# Patient Record
Sex: MALE | Race: WHITE | NOT HISPANIC OR LATINO | Employment: OTHER | URBAN - METROPOLITAN AREA
[De-identification: names, ages, dates, MRNs, and addresses within clinical notes are randomized per-mention and may not be internally consistent; named-entity substitution may affect disease eponyms.]

---

## 2023-09-12 ENCOUNTER — HOSPITAL ENCOUNTER (INPATIENT)
Facility: HOSPITAL | Age: 68
LOS: 3 days | Discharge: HOME | End: 2023-09-15
Attending: EMERGENCY MEDICINE | Admitting: HOSPITALIST

## 2023-09-12 ENCOUNTER — APPOINTMENT (OUTPATIENT)
Dept: GENERAL RADIOLOGY | Facility: HOSPITAL | Age: 68
End: 2023-09-12

## 2023-09-12 ENCOUNTER — APPOINTMENT (OUTPATIENT)
Dept: CT IMAGING | Facility: HOSPITAL | Age: 68
End: 2023-09-12

## 2023-09-12 DIAGNOSIS — D72.825 BANDEMIA: ICD-10-CM

## 2023-09-12 DIAGNOSIS — R11.2 NAUSEA VOMITING AND DIARRHEA: Primary | ICD-10-CM

## 2023-09-12 DIAGNOSIS — R73.9 HYPERGLYCEMIA: ICD-10-CM

## 2023-09-12 DIAGNOSIS — R50.9 FEVER, UNSPECIFIED FEVER CAUSE: ICD-10-CM

## 2023-09-12 DIAGNOSIS — E87.1 HYPONATREMIA: ICD-10-CM

## 2023-09-12 DIAGNOSIS — R19.7 DIARRHEA OF PRESUMED INFECTIOUS ORIGIN: ICD-10-CM

## 2023-09-12 DIAGNOSIS — K76.0 FATTY LIVER: ICD-10-CM

## 2023-09-12 DIAGNOSIS — R19.7 NAUSEA VOMITING AND DIARRHEA: Primary | ICD-10-CM

## 2023-09-12 DIAGNOSIS — R79.89 ELEVATED LFTS: ICD-10-CM

## 2023-09-12 PROBLEM — N39.0 ACUTE UTI (URINARY TRACT INFECTION): Status: ACTIVE | Noted: 2023-09-12

## 2023-09-12 PROBLEM — E87.20 LACTIC ACIDOSIS: Status: ACTIVE | Noted: 2023-09-12

## 2023-09-12 LAB
ALBUMIN SERPL-MCNC: 3.5 G/DL (ref 3.5–5.2)
ALBUMIN/GLOB SERPL: 0.7 G/DL
ALP SERPL-CCNC: 88 U/L (ref 39–117)
ALT SERPL W P-5'-P-CCNC: 56 U/L (ref 1–41)
ANION GAP SERPL CALCULATED.3IONS-SCNC: 12 MMOL/L (ref 5–15)
AST SERPL-CCNC: 58 U/L (ref 1–40)
BACTERIA UR QL AUTO: ABNORMAL /HPF
BASOPHILS # BLD MANUAL: 0 10*3/MM3 (ref 0–0.2)
BASOPHILS NFR BLD MANUAL: 0 % (ref 0–1.5)
BILIRUB SERPL-MCNC: 1.3 MG/DL (ref 0–1.2)
BILIRUB UR QL STRIP: ABNORMAL
BUN SERPL-MCNC: 18 MG/DL (ref 8–23)
BUN/CREAT SERPL: 14.8 (ref 7–25)
CALCIUM SPEC-SCNC: 9.1 MG/DL (ref 8.6–10.5)
CHLORIDE SERPL-SCNC: 91 MMOL/L (ref 98–107)
CLARITY UR: CLEAR
CO2 SERPL-SCNC: 23 MMOL/L (ref 22–29)
COLOR UR: ABNORMAL
CREAT BLDA-MCNC: 1.2 MG/DL
CREAT SERPL-MCNC: 1.22 MG/DL (ref 0.76–1.27)
D-LACTATE SERPL-SCNC: 1.7 MMOL/L (ref 0.5–2)
D-LACTATE SERPL-SCNC: 3.2 MMOL/L (ref 0.5–2)
DEPRECATED RDW RBC AUTO: 44.9 FL (ref 37–54)
EGFRCR SERPLBLD CKD-EPI 2021: 64.6 ML/MIN/1.73
EOSINOPHIL # BLD MANUAL: 0 10*3/MM3 (ref 0–0.4)
EOSINOPHIL NFR BLD MANUAL: 0 % (ref 0.3–6.2)
ERYTHROCYTE [DISTWIDTH] IN BLOOD BY AUTOMATED COUNT: 13.9 % (ref 12.3–15.4)
FLUAV SUBTYP SPEC NAA+PROBE: NOT DETECTED
FLUBV RNA ISLT QL NAA+PROBE: NOT DETECTED
GLOBULIN UR ELPH-MCNC: 5.2 GM/DL
GLUCOSE SERPL-MCNC: 303 MG/DL (ref 65–99)
GLUCOSE UR STRIP-MCNC: ABNORMAL MG/DL
HAV IGM SERPL QL IA: NORMAL
HBV CORE IGM SERPL QL IA: NORMAL
HBV SURFACE AG SERPL QL IA: NORMAL
HCT VFR BLD AUTO: 44.7 % (ref 37.5–51)
HCV AB SER DONR QL: NORMAL
HGB BLD-MCNC: 14.9 G/DL (ref 13–17.7)
HGB UR QL STRIP.AUTO: NEGATIVE
HOLD SPECIMEN: NORMAL
HYALINE CASTS UR QL AUTO: ABNORMAL /LPF
KETONES UR QL STRIP: NEGATIVE
LEUKOCYTE ESTERASE UR QL STRIP.AUTO: ABNORMAL
LIPASE SERPL-CCNC: 32 U/L (ref 13–60)
LYMPHOCYTES # BLD MANUAL: 2.26 10*3/MM3 (ref 0.7–3.1)
LYMPHOCYTES NFR BLD MANUAL: 8 % (ref 5–12)
MCH RBC QN AUTO: 29.7 PG (ref 26.6–33)
MCHC RBC AUTO-ENTMCNC: 33.3 G/DL (ref 31.5–35.7)
MCV RBC AUTO: 89.2 FL (ref 79–97)
MONOCYTES # BLD: 0.82 10*3/MM3 (ref 0.1–0.9)
MUCOUS THREADS URNS QL MICRO: ABNORMAL /HPF
NEUTROPHILS # BLD AUTO: 7.18 10*3/MM3 (ref 1.7–7)
NEUTROPHILS NFR BLD MANUAL: 60 % (ref 42.7–76)
NEUTS BAND NFR BLD MANUAL: 10 % (ref 0–5)
NITRITE UR QL STRIP: NEGATIVE
OSMOLALITY SERPL: 289 MOSM/KG (ref 275–295)
OSMOLALITY UR: 715 MOSM/KG (ref 300–1100)
PH UR STRIP.AUTO: 5.5 [PH] (ref 5–8)
PLATELET # BLD AUTO: 155 10*3/MM3 (ref 140–450)
PMV BLD AUTO: 9.3 FL (ref 6–12)
POTASSIUM SERPL-SCNC: 4 MMOL/L (ref 3.5–5.2)
PROCALCITONIN SERPL-MCNC: 0.73 NG/ML (ref 0–0.25)
PROT SERPL-MCNC: 8.7 G/DL (ref 6–8.5)
PROT UR QL STRIP: ABNORMAL
RBC # BLD AUTO: 5.01 10*6/MM3 (ref 4.14–5.8)
RBC # UR STRIP: ABNORMAL /HPF
RBC MORPH BLD: NORMAL
REF LAB TEST METHOD: ABNORMAL
SARS-COV-2 RNA RESP QL NAA+PROBE: NOT DETECTED
SMALL PLATELETS BLD QL SMEAR: ADEQUATE
SODIUM SERPL-SCNC: 126 MMOL/L (ref 136–145)
SODIUM UR-SCNC: <20 MMOL/L
SP GR UR STRIP: 1.02 (ref 1–1.03)
SQUAMOUS #/AREA URNS HPF: ABNORMAL /HPF
UROBILINOGEN UR QL STRIP: ABNORMAL
VARIANT LYMPHS NFR BLD MANUAL: 19 % (ref 19.6–45.3)
VARIANT LYMPHS NFR BLD MANUAL: 3 % (ref 0–5)
WBC # UR STRIP: ABNORMAL /HPF
WBC MORPH BLD: NORMAL
WBC NRBC COR # BLD: 10.25 10*3/MM3 (ref 3.4–10.8)
WHOLE BLOOD HOLD COAG: NORMAL
WHOLE BLOOD HOLD SPECIMEN: NORMAL

## 2023-09-12 PROCEDURE — 25010000002 VANCOMYCIN 10 G RECONSTITUTED SOLUTION: Performed by: INTERNAL MEDICINE

## 2023-09-12 PROCEDURE — 83605 ASSAY OF LACTIC ACID: CPT | Performed by: INTERNAL MEDICINE

## 2023-09-12 PROCEDURE — 99223 1ST HOSP IP/OBS HIGH 75: CPT | Performed by: INTERNAL MEDICINE

## 2023-09-12 PROCEDURE — 84145 PROCALCITONIN (PCT): CPT | Performed by: INTERNAL MEDICINE

## 2023-09-12 PROCEDURE — 81001 URINALYSIS AUTO W/SCOPE: CPT | Performed by: EMERGENCY MEDICINE

## 2023-09-12 PROCEDURE — 84300 ASSAY OF URINE SODIUM: CPT | Performed by: INTERNAL MEDICINE

## 2023-09-12 PROCEDURE — 87636 SARSCOV2 & INF A&B AMP PRB: CPT | Performed by: PHYSICIAN ASSISTANT

## 2023-09-12 PROCEDURE — 83036 HEMOGLOBIN GLYCOSYLATED A1C: CPT | Performed by: INTERNAL MEDICINE

## 2023-09-12 PROCEDURE — 87086 URINE CULTURE/COLONY COUNT: CPT | Performed by: INTERNAL MEDICINE

## 2023-09-12 PROCEDURE — 25010000002 PIPERACILLIN SOD-TAZOBACTAM PER 1 G: Performed by: INTERNAL MEDICINE

## 2023-09-12 PROCEDURE — 83690 ASSAY OF LIPASE: CPT | Performed by: EMERGENCY MEDICINE

## 2023-09-12 PROCEDURE — 80074 ACUTE HEPATITIS PANEL: CPT | Performed by: INTERNAL MEDICINE

## 2023-09-12 PROCEDURE — 82565 ASSAY OF CREATININE: CPT

## 2023-09-12 PROCEDURE — 25510000001 IOPAMIDOL 61 % SOLUTION: Performed by: EMERGENCY MEDICINE

## 2023-09-12 PROCEDURE — 71045 X-RAY EXAM CHEST 1 VIEW: CPT

## 2023-09-12 PROCEDURE — 85007 BL SMEAR W/DIFF WBC COUNT: CPT | Performed by: EMERGENCY MEDICINE

## 2023-09-12 PROCEDURE — 87040 BLOOD CULTURE FOR BACTERIA: CPT | Performed by: PHYSICIAN ASSISTANT

## 2023-09-12 PROCEDURE — 85025 COMPLETE CBC W/AUTO DIFF WBC: CPT | Performed by: EMERGENCY MEDICINE

## 2023-09-12 PROCEDURE — 83930 ASSAY OF BLOOD OSMOLALITY: CPT | Performed by: PHYSICIAN ASSISTANT

## 2023-09-12 PROCEDURE — 74177 CT ABD & PELVIS W/CONTRAST: CPT

## 2023-09-12 PROCEDURE — 80053 COMPREHEN METABOLIC PANEL: CPT | Performed by: EMERGENCY MEDICINE

## 2023-09-12 PROCEDURE — 99285 EMERGENCY DEPT VISIT HI MDM: CPT

## 2023-09-12 PROCEDURE — 83605 ASSAY OF LACTIC ACID: CPT | Performed by: EMERGENCY MEDICINE

## 2023-09-12 PROCEDURE — 83935 ASSAY OF URINE OSMOLALITY: CPT | Performed by: INTERNAL MEDICINE

## 2023-09-12 RX ORDER — SODIUM CHLORIDE 0.9 % (FLUSH) 0.9 %
10 SYRINGE (ML) INJECTION AS NEEDED
Status: DISCONTINUED | OUTPATIENT
Start: 2023-09-12 | End: 2023-09-15 | Stop reason: HOSPADM

## 2023-09-12 RX ORDER — BISACODYL 10 MG
10 SUPPOSITORY, RECTAL RECTAL DAILY PRN
Status: DISCONTINUED | OUTPATIENT
Start: 2023-09-12 | End: 2023-09-13

## 2023-09-12 RX ORDER — DEXTROSE MONOHYDRATE 25 G/50ML
25 INJECTION, SOLUTION INTRAVENOUS
Status: DISCONTINUED | OUTPATIENT
Start: 2023-09-12 | End: 2023-09-15 | Stop reason: HOSPADM

## 2023-09-12 RX ORDER — HEPARIN SODIUM 5000 [USP'U]/ML
5000 INJECTION, SOLUTION INTRAVENOUS; SUBCUTANEOUS EVERY 12 HOURS SCHEDULED
Status: DISCONTINUED | OUTPATIENT
Start: 2023-09-13 | End: 2023-09-13

## 2023-09-12 RX ORDER — IBUPROFEN 600 MG/1
1 TABLET ORAL
Status: DISCONTINUED | OUTPATIENT
Start: 2023-09-12 | End: 2023-09-15 | Stop reason: HOSPADM

## 2023-09-12 RX ORDER — BISACODYL 5 MG/1
5 TABLET, DELAYED RELEASE ORAL DAILY PRN
Status: DISCONTINUED | OUTPATIENT
Start: 2023-09-12 | End: 2023-09-13

## 2023-09-12 RX ORDER — AMOXICILLIN 250 MG
2 CAPSULE ORAL 2 TIMES DAILY
Status: DISCONTINUED | OUTPATIENT
Start: 2023-09-13 | End: 2023-09-13

## 2023-09-12 RX ORDER — SODIUM CHLORIDE 0.9 % (FLUSH) 0.9 %
10 SYRINGE (ML) INJECTION EVERY 12 HOURS SCHEDULED
Status: DISCONTINUED | OUTPATIENT
Start: 2023-09-13 | End: 2023-09-15 | Stop reason: HOSPADM

## 2023-09-12 RX ORDER — SODIUM CHLORIDE 9 MG/ML
40 INJECTION, SOLUTION INTRAVENOUS AS NEEDED
Status: DISCONTINUED | OUTPATIENT
Start: 2023-09-12 | End: 2023-09-15 | Stop reason: HOSPADM

## 2023-09-12 RX ORDER — SODIUM CHLORIDE 9 MG/ML
125 INJECTION, SOLUTION INTRAVENOUS CONTINUOUS
Status: DISCONTINUED | OUTPATIENT
Start: 2023-09-13 | End: 2023-09-13

## 2023-09-12 RX ORDER — NICOTINE POLACRILEX 4 MG
15 LOZENGE BUCCAL
Status: DISCONTINUED | OUTPATIENT
Start: 2023-09-12 | End: 2023-09-15 | Stop reason: HOSPADM

## 2023-09-12 RX ORDER — VANCOMYCIN/0.9 % SOD CHLORIDE 1.5G/250ML
15 PLASTIC BAG, INJECTION (ML) INTRAVENOUS EVERY 24 HOURS
Status: DISCONTINUED | OUTPATIENT
Start: 2023-09-13 | End: 2023-09-14

## 2023-09-12 RX ORDER — NITROGLYCERIN 0.4 MG/1
0.4 TABLET SUBLINGUAL
Status: DISCONTINUED | OUTPATIENT
Start: 2023-09-12 | End: 2023-09-15 | Stop reason: HOSPADM

## 2023-09-12 RX ORDER — INSULIN LISPRO 100 [IU]/ML
2-7 INJECTION, SOLUTION INTRAVENOUS; SUBCUTANEOUS
Status: DISCONTINUED | OUTPATIENT
Start: 2023-09-13 | End: 2023-09-15 | Stop reason: HOSPADM

## 2023-09-12 RX ORDER — POLYETHYLENE GLYCOL 3350 17 G/17G
17 POWDER, FOR SOLUTION ORAL DAILY PRN
Status: DISCONTINUED | OUTPATIENT
Start: 2023-09-12 | End: 2023-09-13

## 2023-09-12 RX ORDER — SODIUM CHLORIDE 9 MG/ML
10 INJECTION INTRAVENOUS AS NEEDED
Status: DISCONTINUED | OUTPATIENT
Start: 2023-09-12 | End: 2023-09-15 | Stop reason: HOSPADM

## 2023-09-12 RX ADMIN — IOPAMIDOL 85 ML: 612 INJECTION, SOLUTION INTRAVENOUS at 16:47

## 2023-09-12 RX ADMIN — PIPERACILLIN SODIUM AND TAZOBACTAM SODIUM 3.38 G: 3; .375 INJECTION, SOLUTION INTRAVENOUS at 22:38

## 2023-09-12 RX ADMIN — VANCOMYCIN HYDROCHLORIDE 1750 MG: 10 INJECTION, POWDER, LYOPHILIZED, FOR SOLUTION INTRAVENOUS at 22:38

## 2023-09-12 RX ADMIN — SODIUM CHLORIDE 1000 ML: 9 INJECTION, SOLUTION INTRAVENOUS at 18:54

## 2023-09-12 NOTE — ED PROVIDER NOTES
Subjective   History of Present Illness  Mr. Faye is a 68-year-old male with no known health issues other than prior kidney stones, who presents to the emergency department with a 1 month history of nausea, vomiting and diarrhea and low-grade fevers off and on.  He has had some mild right flank pain and some urinary incontinence but has had no dysuria or gross hematuria.  No bloody stools.  No cough, chest pain or shortness of breath.  No known exposures.  The patient has no PCP and has not seen anyone for his symptoms.  He states that he has had about 8 pound weight loss in the past 2 weeks.  He feels dizzy on standing.  He is a non-smoker.  No alcohol use.    Review of Systems   Constitutional:  Positive for appetite change, chills, fatigue, fever and unexpected weight change.   HENT:  Negative for sore throat.    Respiratory:  Negative for cough and shortness of breath.    Cardiovascular:  Negative for chest pain and palpitations.   Gastrointestinal:  Positive for diarrhea, nausea and vomiting. Negative for abdominal pain and blood in stool.   Genitourinary:  Positive for flank pain (Mild right flank pain). Negative for dysuria and testicular pain.   Musculoskeletal:  Negative for back pain.   Skin:  Negative for color change, pallor and rash.   Allergic/Immunologic: Negative for immunocompromised state.   Neurological:  Positive for weakness (Generalized) and light-headedness. Negative for headaches.   Hematological: Negative.    Psychiatric/Behavioral:  Negative for confusion.      No past medical history on file.    No Known Allergies    No past surgical history on file.    No family history on file.    Social History     Socioeconomic History    Marital status:            Objective   Physical Exam  Constitutional:       General: He is not in acute distress.     Appearance: Normal appearance. He is not toxic-appearing or diaphoretic.   HENT:      Head: Normocephalic.      Nose: Nose normal.       Mouth/Throat:      Mouth: Mucous membranes are moist.   Eyes:      General: No scleral icterus.     Conjunctiva/sclera: Conjunctivae normal.      Pupils: Pupils are equal, round, and reactive to light.   Cardiovascular:      Rate and Rhythm: Normal rate and regular rhythm.      Pulses: Normal pulses.      Heart sounds: Normal heart sounds.   Pulmonary:      Effort: Pulmonary effort is normal.      Breath sounds: Normal breath sounds.   Abdominal:      General: Bowel sounds are normal.      Tenderness: There is no abdominal tenderness.   Musculoskeletal:         General: Normal range of motion.      Cervical back: Normal range of motion and neck supple.   Skin:     General: Skin is warm and dry.      Coloration: Skin is not jaundiced or pale.      Findings: No erythema or rash.   Neurological:      General: No focal deficit present.      Mental Status: He is alert and oriented to person, place, and time.   Psychiatric:         Mood and Affect: Mood normal.       Procedures           ED Course      Mr. Faye is a 68 yr old male with a history of prior kidney stones who presents to the ED with c/o N/V/D and low grade fevers off/on for the past month.  He states he has lost appetite and has lost 8 lbs in the past 2 wks.  He has night sweats and chills.  Intermittent right flank pain.  No dysuria but some increased frequency.  He feels generally weak.  No chest pain or cough.  No known exposures.      MDM:  differential includes colitis, gastroenteritis, UTI, malignancy, COVID, diverticulitis, etc.    Labs, UA, ordered.  Stool for c diff toxin and culture ordered.  COVID swab collected.  Pt sent for CT abd/pelvis.  Pt started on IV fluids.    White count is 10.25 with bandemia (10%).  Lactic acid is elevated at 3.2.    Glucose is elevated at 303.  Normal creatinine at 1.22.    Sodium is down at 126.  No other concerning electrolytes.    LFTs are up slightly with ALT of 56 and AST of 58 with a bili of 1.3.  Normal lipase  "at 32.      UA shows 6-12 WBC with 2+ bacteria.  Will culture and start on Rocephin.    CT abd/pelvis;  Hepatic steatosis and mild splenomegaly.     Otherwise, no evidence of acute abnormality in the abdomen or pelvis.    CXR shows no active disease.    COVID swab is negative.      I spoke with pt and his wife about his workup.  He states he feels 'terrible\" and is chilling at the moment.  I checked his temp and it was 100.2.    Given his bandemia and his recent fevers, I think admission for IV abx and further workup is warranted.   He has been unable to provide a stool specimen as of yet.  Will discuss with hospitalist and plan to admit.                                                            Medical Decision Making  Problems Addressed:  Bandemia: complicated acute illness or injury  Fever, unspecified fever cause: complicated acute illness or injury  Hyperglycemia: complicated acute illness or injury  Hyponatremia: complicated acute illness or injury  Nausea vomiting and diarrhea: complicated acute illness or injury    Amount and/or Complexity of Data Reviewed  Labs: ordered.  Radiology: ordered.    Risk  Prescription drug management.  Decision regarding hospitalization.        Final diagnoses:   Nausea vomiting and diarrhea   Bandemia   Hyponatremia   Fever, unspecified fever cause   Hyperglycemia       ED Disposition  ED Disposition       ED Disposition   Decision to Admit    Condition   --    Comment   --               No follow-up provider specified.       Medication List      No changes were made to your prescriptions during this visit.            Bruno Ryan PA  09/12/23 2119       Bruno Ryan PA  09/12/23 2121    "

## 2023-09-13 ENCOUNTER — APPOINTMENT (OUTPATIENT)
Dept: ULTRASOUND IMAGING | Facility: HOSPITAL | Age: 68
End: 2023-09-13

## 2023-09-13 LAB
ADV 40+41 DNA STL QL NAA+NON-PROBE: NOT DETECTED
ALBUMIN SERPL-MCNC: 3 G/DL (ref 3.5–5.2)
ALBUMIN/GLOB SERPL: 0.7 G/DL
ALP SERPL-CCNC: 72 U/L (ref 39–117)
ALPHA1 GLOB MFR UR ELPH: 183 MG/DL (ref 90–200)
ALT SERPL W P-5'-P-CCNC: 46 U/L (ref 1–41)
ANION GAP SERPL CALCULATED.3IONS-SCNC: 10 MMOL/L (ref 5–15)
ANION GAP SERPL CALCULATED.3IONS-SCNC: 11 MMOL/L (ref 5–15)
ANION GAP SERPL CALCULATED.3IONS-SCNC: 11 MMOL/L (ref 5–15)
AST SERPL-CCNC: 51 U/L (ref 1–40)
ASTRO TYP 1-8 RNA STL QL NAA+NON-PROBE: NOT DETECTED
BASOPHILS # BLD AUTO: 0.02 10*3/MM3 (ref 0–0.2)
BASOPHILS NFR BLD AUTO: 0.2 % (ref 0–1.5)
BILIRUB SERPL-MCNC: 1.1 MG/DL (ref 0–1.2)
BUN SERPL-MCNC: 14 MG/DL (ref 8–23)
BUN SERPL-MCNC: 15 MG/DL (ref 8–23)
BUN SERPL-MCNC: 15 MG/DL (ref 8–23)
BUN/CREAT SERPL: 12.8 (ref 7–25)
BUN/CREAT SERPL: 12.8 (ref 7–25)
BUN/CREAT SERPL: 13.3 (ref 7–25)
C CAYETANENSIS DNA STL QL NAA+NON-PROBE: NOT DETECTED
C COLI+JEJ+UPSA DNA STL QL NAA+NON-PROBE: NOT DETECTED
C DIFF TOX GENS STL QL NAA+PROBE: NOT DETECTED
CALCIUM SPEC-SCNC: 7.9 MG/DL (ref 8.6–10.5)
CALCIUM SPEC-SCNC: 8 MG/DL (ref 8.6–10.5)
CALCIUM SPEC-SCNC: 8.2 MG/DL (ref 8.6–10.5)
CERULOPLASMIN SERPL-MCNC: 27 MG/DL (ref 16–31)
CHLORIDE SERPL-SCNC: 100 MMOL/L (ref 98–107)
CHLORIDE SERPL-SCNC: 97 MMOL/L (ref 98–107)
CHLORIDE SERPL-SCNC: 99 MMOL/L (ref 98–107)
CO2 SERPL-SCNC: 22 MMOL/L (ref 22–29)
CO2 SERPL-SCNC: 24 MMOL/L (ref 22–29)
CO2 SERPL-SCNC: 24 MMOL/L (ref 22–29)
CORTIS SERPL-MCNC: 20.22 MCG/DL
CREAT SERPL-MCNC: 1.05 MG/DL (ref 0.76–1.27)
CREAT SERPL-MCNC: 1.17 MG/DL (ref 0.76–1.27)
CREAT SERPL-MCNC: 1.17 MG/DL (ref 0.76–1.27)
CRP SERPL-MCNC: 10.78 MG/DL (ref 0–0.5)
CRYPTOSP DNA STL QL NAA+NON-PROBE: NOT DETECTED
D-LACTATE SERPL-SCNC: 1.8 MMOL/L (ref 0.5–2)
D-LACTATE SERPL-SCNC: 2.2 MMOL/L (ref 0.5–2)
DEPRECATED RDW RBC AUTO: 43.2 FL (ref 37–54)
E HISTOLYT DNA STL QL NAA+NON-PROBE: NOT DETECTED
EAEC PAA PLAS AGGR+AATA ST NAA+NON-PRB: NOT DETECTED
EC STX1+STX2 GENES STL QL NAA+NON-PROBE: NOT DETECTED
EGFRCR SERPLBLD CKD-EPI 2021: 67.9 ML/MIN/1.73
EGFRCR SERPLBLD CKD-EPI 2021: 67.9 ML/MIN/1.73
EGFRCR SERPLBLD CKD-EPI 2021: 77.3 ML/MIN/1.73
EOSINOPHIL # BLD AUTO: 0 10*3/MM3 (ref 0–0.4)
EOSINOPHIL NFR BLD AUTO: 0 % (ref 0.3–6.2)
EPEC EAE GENE STL QL NAA+NON-PROBE: NOT DETECTED
ERYTHROCYTE [DISTWIDTH] IN BLOOD BY AUTOMATED COUNT: 13.7 % (ref 12.3–15.4)
ETEC LTA+ST1A+ST1B TOX ST NAA+NON-PROBE: NOT DETECTED
G LAMBLIA DNA STL QL NAA+NON-PROBE: NOT DETECTED
GLOBULIN UR ELPH-MCNC: 4.3 GM/DL
GLUCOSE BLDC GLUCOMTR-MCNC: 122 MG/DL (ref 70–130)
GLUCOSE BLDC GLUCOMTR-MCNC: 162 MG/DL (ref 70–130)
GLUCOSE BLDC GLUCOMTR-MCNC: 172 MG/DL (ref 70–130)
GLUCOSE BLDC GLUCOMTR-MCNC: 187 MG/DL (ref 70–130)
GLUCOSE SERPL-MCNC: 169 MG/DL (ref 65–99)
GLUCOSE SERPL-MCNC: 179 MG/DL (ref 65–99)
GLUCOSE SERPL-MCNC: 187 MG/DL (ref 65–99)
HBA1C MFR BLD: 8 % (ref 4.8–5.6)
HCT VFR BLD AUTO: 36.4 % (ref 37.5–51)
HGB BLD-MCNC: 12.5 G/DL (ref 13–17.7)
IGA1 MFR SER: 415 MG/DL (ref 70–400)
IMM GRANULOCYTES # BLD AUTO: 0.04 10*3/MM3 (ref 0–0.05)
IMM GRANULOCYTES NFR BLD AUTO: 0.5 % (ref 0–0.5)
LYMPHOCYTES # BLD AUTO: 2.25 10*3/MM3 (ref 0.7–3.1)
LYMPHOCYTES NFR BLD AUTO: 26.3 % (ref 19.6–45.3)
MAGNESIUM SERPL-MCNC: 2.1 MG/DL (ref 1.6–2.4)
MCH RBC QN AUTO: 30 PG (ref 26.6–33)
MCHC RBC AUTO-ENTMCNC: 34.3 G/DL (ref 31.5–35.7)
MCV RBC AUTO: 87.3 FL (ref 79–97)
MONOCYTES # BLD AUTO: 0.77 10*3/MM3 (ref 0.1–0.9)
MONOCYTES NFR BLD AUTO: 9 % (ref 5–12)
NEUTROPHILS NFR BLD AUTO: 5.47 10*3/MM3 (ref 1.7–7)
NEUTROPHILS NFR BLD AUTO: 64 % (ref 42.7–76)
NOROVIRUS GI+II RNA STL QL NAA+NON-PROBE: NOT DETECTED
NRBC BLD AUTO-RTO: 0 /100 WBC (ref 0–0.2)
P SHIGELLOIDES DNA STL QL NAA+NON-PROBE: NOT DETECTED
PHOSPHATE SERPL-MCNC: 2.2 MG/DL (ref 2.5–4.5)
PHOSPHATE SERPL-MCNC: 2.2 MG/DL (ref 2.5–4.5)
PLAT MORPH BLD: NORMAL
PLATELET # BLD AUTO: 143 10*3/MM3 (ref 140–450)
PMV BLD AUTO: 10.4 FL (ref 6–12)
POTASSIUM SERPL-SCNC: 3.7 MMOL/L (ref 3.5–5.2)
POTASSIUM SERPL-SCNC: 3.8 MMOL/L (ref 3.5–5.2)
POTASSIUM SERPL-SCNC: 4.2 MMOL/L (ref 3.5–5.2)
PROT SERPL-MCNC: 7.3 G/DL (ref 6–8.5)
RBC # BLD AUTO: 4.17 10*6/MM3 (ref 4.14–5.8)
RBC MORPH BLD: NORMAL
RVA RNA STL QL NAA+NON-PROBE: NOT DETECTED
S ENT+BONG DNA STL QL NAA+NON-PROBE: NOT DETECTED
SAPO I+II+IV+V RNA STL QL NAA+NON-PROBE: NOT DETECTED
SHIGELLA SP+EIEC IPAH ST NAA+NON-PROBE: NOT DETECTED
SODIUM SERPL-SCNC: 132 MMOL/L (ref 136–145)
SODIUM SERPL-SCNC: 133 MMOL/L (ref 136–145)
SODIUM SERPL-SCNC: 133 MMOL/L (ref 136–145)
TSH SERPL DL<=0.05 MIU/L-ACNC: 0.89 UIU/ML (ref 0.27–4.2)
V CHOL+PARA+VUL DNA STL QL NAA+NON-PROBE: NOT DETECTED
V CHOLERAE DNA STL QL NAA+NON-PROBE: NOT DETECTED
WBC MORPH BLD: NORMAL
WBC NRBC COR # BLD: 8.55 10*3/MM3 (ref 3.4–10.8)
Y ENTEROCOL DNA STL QL NAA+NON-PROBE: NOT DETECTED

## 2023-09-13 PROCEDURE — 86644 CMV ANTIBODY: CPT | Performed by: PHYSICIAN ASSISTANT

## 2023-09-13 PROCEDURE — 82784 ASSAY IGA/IGD/IGG/IGM EACH: CPT | Performed by: PHYSICIAN ASSISTANT

## 2023-09-13 PROCEDURE — 97161 PT EVAL LOW COMPLEX 20 MIN: CPT

## 2023-09-13 PROCEDURE — 87493 C DIFF AMPLIFIED PROBE: CPT | Performed by: PHYSICIAN ASSISTANT

## 2023-09-13 PROCEDURE — 25010000002 PIPERACILLIN SOD-TAZOBACTAM PER 1 G: Performed by: INTERNAL MEDICINE

## 2023-09-13 PROCEDURE — 25010000002 VANCOMYCIN 10 G RECONSTITUTED SOLUTION: Performed by: INTERNAL MEDICINE

## 2023-09-13 PROCEDURE — 82390 ASSAY OF CERULOPLASMIN: CPT | Performed by: PHYSICIAN ASSISTANT

## 2023-09-13 PROCEDURE — 25010000002 HEPARIN (PORCINE) PER 1000 UNITS: Performed by: STUDENT IN AN ORGANIZED HEALTH CARE EDUCATION/TRAINING PROGRAM

## 2023-09-13 PROCEDURE — 82533 TOTAL CORTISOL: CPT | Performed by: INTERNAL MEDICINE

## 2023-09-13 PROCEDURE — 85007 BL SMEAR W/DIFF WBC COUNT: CPT | Performed by: INTERNAL MEDICINE

## 2023-09-13 PROCEDURE — 99222 1ST HOSP IP/OBS MODERATE 55: CPT | Performed by: PHYSICIAN ASSISTANT

## 2023-09-13 PROCEDURE — 86665 EPSTEIN-BARR CAPSID VCA: CPT | Performed by: PHYSICIAN ASSISTANT

## 2023-09-13 PROCEDURE — 25010000002 HEPARIN (PORCINE) PER 1000 UNITS: Performed by: INTERNAL MEDICINE

## 2023-09-13 PROCEDURE — 86645 CMV ANTIBODY IGM: CPT | Performed by: PHYSICIAN ASSISTANT

## 2023-09-13 PROCEDURE — 25010000002 ONDANSETRON PER 1 MG: Performed by: STUDENT IN AN ORGANIZED HEALTH CARE EDUCATION/TRAINING PROGRAM

## 2023-09-13 PROCEDURE — 84100 ASSAY OF PHOSPHORUS: CPT | Performed by: INTERNAL MEDICINE

## 2023-09-13 PROCEDURE — 80053 COMPREHEN METABOLIC PANEL: CPT | Performed by: INTERNAL MEDICINE

## 2023-09-13 PROCEDURE — 82103 ALPHA-1-ANTITRYPSIN TOTAL: CPT | Performed by: PHYSICIAN ASSISTANT

## 2023-09-13 PROCEDURE — 86364 TISS TRNSGLTMNASE EA IG CLAS: CPT | Performed by: PHYSICIAN ASSISTANT

## 2023-09-13 PROCEDURE — 99232 SBSQ HOSP IP/OBS MODERATE 35: CPT | Performed by: STUDENT IN AN ORGANIZED HEALTH CARE EDUCATION/TRAINING PROGRAM

## 2023-09-13 PROCEDURE — 87507 IADNA-DNA/RNA PROBE TQ 12-25: CPT | Performed by: PHYSICIAN ASSISTANT

## 2023-09-13 PROCEDURE — 83735 ASSAY OF MAGNESIUM: CPT | Performed by: INTERNAL MEDICINE

## 2023-09-13 PROCEDURE — 85025 COMPLETE CBC W/AUTO DIFF WBC: CPT | Performed by: INTERNAL MEDICINE

## 2023-09-13 PROCEDURE — 84443 ASSAY THYROID STIM HORMONE: CPT | Performed by: INTERNAL MEDICINE

## 2023-09-13 PROCEDURE — 86682 HELMINTH ANTIBODY: CPT | Performed by: PHYSICIAN ASSISTANT

## 2023-09-13 PROCEDURE — 63710000001 INSULIN LISPRO (HUMAN) PER 5 UNITS: Performed by: INTERNAL MEDICINE

## 2023-09-13 PROCEDURE — 83605 ASSAY OF LACTIC ACID: CPT | Performed by: INTERNAL MEDICINE

## 2023-09-13 PROCEDURE — 82948 REAGENT STRIP/BLOOD GLUCOSE: CPT

## 2023-09-13 PROCEDURE — 86140 C-REACTIVE PROTEIN: CPT | Performed by: INTERNAL MEDICINE

## 2023-09-13 PROCEDURE — 97530 THERAPEUTIC ACTIVITIES: CPT

## 2023-09-13 RX ORDER — ONDANSETRON 2 MG/ML
4 INJECTION INTRAMUSCULAR; INTRAVENOUS EVERY 6 HOURS PRN
Status: DISCONTINUED | OUTPATIENT
Start: 2023-09-13 | End: 2023-09-15 | Stop reason: HOSPADM

## 2023-09-13 RX ORDER — DOXYCYCLINE 100 MG/1
100 CAPSULE ORAL EVERY 12 HOURS SCHEDULED
Status: DISCONTINUED | OUTPATIENT
Start: 2023-09-13 | End: 2023-09-15 | Stop reason: HOSPADM

## 2023-09-13 RX ORDER — HEPARIN SODIUM 5000 [USP'U]/ML
5000 INJECTION, SOLUTION INTRAVENOUS; SUBCUTANEOUS EVERY 8 HOURS SCHEDULED
Status: DISCONTINUED | OUTPATIENT
Start: 2023-09-13 | End: 2023-09-15 | Stop reason: HOSPADM

## 2023-09-13 RX ORDER — SODIUM CHLORIDE 450 MG/100ML
100 INJECTION, SOLUTION INTRAVENOUS CONTINUOUS
Status: DISCONTINUED | OUTPATIENT
Start: 2023-09-13 | End: 2023-09-15 | Stop reason: HOSPADM

## 2023-09-13 RX ORDER — L.ACID,PARA/B.BIFIDUM/S.THERM 8B CELL
1 CAPSULE ORAL DAILY
Status: DISCONTINUED | OUTPATIENT
Start: 2023-09-13 | End: 2023-09-15 | Stop reason: HOSPADM

## 2023-09-13 RX ORDER — ONDANSETRON 4 MG/1
4 TABLET, FILM COATED ORAL EVERY 6 HOURS PRN
Status: DISCONTINUED | OUTPATIENT
Start: 2023-09-13 | End: 2023-09-15 | Stop reason: HOSPADM

## 2023-09-13 RX ORDER — ACETAMINOPHEN 325 MG/1
650 TABLET ORAL EVERY 6 HOURS PRN
Status: DISCONTINUED | OUTPATIENT
Start: 2023-09-13 | End: 2023-09-15 | Stop reason: HOSPADM

## 2023-09-13 RX ADMIN — SODIUM CHLORIDE 125 ML/HR: 9 INJECTION, SOLUTION INTRAVENOUS at 00:14

## 2023-09-13 RX ADMIN — PIPERACILLIN SODIUM AND TAZOBACTAM SODIUM 3.38 G: 3; .375 INJECTION, SOLUTION INTRAVENOUS at 05:36

## 2023-09-13 RX ADMIN — Medication 10 ML: at 21:46

## 2023-09-13 RX ADMIN — Medication 1 CAPSULE: at 16:59

## 2023-09-13 RX ADMIN — HEPARIN SODIUM 5000 UNITS: 5000 INJECTION, SOLUTION INTRAVENOUS; SUBCUTANEOUS at 14:32

## 2023-09-13 RX ADMIN — HEPARIN SODIUM 5000 UNITS: 5000 INJECTION, SOLUTION INTRAVENOUS; SUBCUTANEOUS at 20:59

## 2023-09-13 RX ADMIN — ACETAMINOPHEN 650 MG: 325 TABLET ORAL at 20:58

## 2023-09-13 RX ADMIN — HEPARIN SODIUM 5000 UNITS: 5000 INJECTION INTRAVENOUS; SUBCUTANEOUS at 00:14

## 2023-09-13 RX ADMIN — POTASSIUM & SODIUM PHOSPHATES POWDER PACK 280-160-250 MG 2 PACKET: 280-160-250 PACK at 20:58

## 2023-09-13 RX ADMIN — DOXYCYCLINE 100 MG: 100 CAPSULE ORAL at 20:58

## 2023-09-13 RX ADMIN — SENNOSIDES AND DOCUSATE SODIUM 2 TABLET: 8.6; 5 TABLET ORAL at 00:14

## 2023-09-13 RX ADMIN — SODIUM PHOSPHATE, MONOBASIC, MONOHYDRATE AND SODIUM PHOSPHATE, DIBASIC, ANHYDROUS 15 MMOL: 142; 276 INJECTION, SOLUTION INTRAVENOUS at 08:08

## 2023-09-13 RX ADMIN — PIPERACILLIN SODIUM AND TAZOBACTAM SODIUM 3.38 G: 3; .375 INJECTION, SOLUTION INTRAVENOUS at 14:32

## 2023-09-13 RX ADMIN — HEPARIN SODIUM 5000 UNITS: 5000 INJECTION INTRAVENOUS; SUBCUTANEOUS at 08:08

## 2023-09-13 RX ADMIN — VANCOMYCIN HYDROCHLORIDE 1500 MG: 10 INJECTION, POWDER, LYOPHILIZED, FOR SOLUTION INTRAVENOUS at 17:00

## 2023-09-13 RX ADMIN — Medication 10 ML: at 00:20

## 2023-09-13 RX ADMIN — ACETAMINOPHEN 650 MG: 325 TABLET ORAL at 09:47

## 2023-09-13 RX ADMIN — INSULIN LISPRO 2 UNITS: 100 INJECTION, SOLUTION INTRAVENOUS; SUBCUTANEOUS at 08:08

## 2023-09-13 RX ADMIN — PIPERACILLIN SODIUM AND TAZOBACTAM SODIUM 3.38 G: 3; .375 INJECTION, SOLUTION INTRAVENOUS at 20:59

## 2023-09-13 RX ADMIN — SODIUM CHLORIDE 100 ML/HR: 4.5 INJECTION, SOLUTION INTRAVENOUS at 06:45

## 2023-09-13 RX ADMIN — ONDANSETRON 4 MG: 2 INJECTION INTRAMUSCULAR; INTRAVENOUS at 17:19

## 2023-09-13 RX ADMIN — INSULIN LISPRO 2 UNITS: 100 INJECTION, SOLUTION INTRAVENOUS; SUBCUTANEOUS at 17:19

## 2023-09-13 RX ADMIN — ONDANSETRON 4 MG: 2 INJECTION INTRAMUSCULAR; INTRAVENOUS at 09:47

## 2023-09-13 NOTE — CONSULTS
"    INFECTIOUS DISEASE CONSULT/INITIAL HOSPITAL VISIT    Peter Faye  1955  [id number]    Date of Consult: 9/13/2023    Admission Date: 9/12/2023      Requesting Provider: No ref. provider found  Evaluating Physician: Eva Whiteside MD    Reason for Consultation: fuo    History of present illness:    Patient is a 68 y.o. male with history of nephrolithiasis who presented to the ED 9/12/23 with a 1 month history of intermittent fever (as high as 102), diarrhea, nausea and vomiting. Over the course of 1 month he lost 8 lbs. He did not notice a rash or joint pain. On arrival at the ED WBC 10 with L shift and 3% atypical lymphocytes, lactic acid 3.2, PCT 0.73, lipase normal and Na 126  Platelets 155-> 143 and transaminases were mildly elevated. He was started on Zosyn and vancomycin. Tm 101.3  His nausea and vomiting are much improved and he was able to eat ~ 50% of lunch consisting of chicken and rice. He has continued to have diarrhea. GI panel pcr and cdiff are negative. CT abd significant only for hepatic steatosis and mild splenomegaly  UA with mild pyuria. 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 He removed several ticks earlier this year but not recently No pets at home. He has never lived or worked on a farm. No travel in the last 20 years. 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     Past Medical History:   Diagnosis Date    Nephrolithiasis    SURGICAL HISTORY  Procedure for ureteral stone ~ 10 years ago  ORIF leg fracture > 10 years ago    Family History   Family history unknown: Yes       Social History     Socioeconomic History    Marital status:    Tobacco Use    Smoking status: Never    Smokeless tobacco: Never "   Vaping Use    Vaping Use: Never used    Passive vaping exposure: Yes   Substance and Sexual Activity    Alcohol use: Not Currently    Drug use: Never    Sexual activity: Defer       No Known Allergies      Medication:    Current Facility-Administered Medications:     acetaminophen (TYLENOL) tablet 650 mg, 650 mg, Oral, Q6H PRN, Yanet Reed MD, 650 mg at 09/13/23 0947    sennosides-docusate (PERICOLACE) 8.6-50 MG per tablet 2 tablet, 2 tablet, Oral, BID, 2 tablet at 09/13/23 0014 **AND** polyethylene glycol (MIRALAX) packet 17 g, 17 g, Oral, Daily PRN **AND** bisacodyl (DULCOLAX) EC tablet 5 mg, 5 mg, Oral, Daily PRN **AND** bisacodyl (DULCOLAX) suppository 10 mg, 10 mg, Rectal, Daily PRN, Cristin Palma MD    Calcium Replacement - Follow Nurse / BPA Driven Protocol, , Does not apply, PRN, Cristin Palma MD    dextrose (D50W) (25 g/50 mL) IV injection 25 g, 25 g, Intravenous, Q15 Min PRN, Cristin Palma MD    dextrose (GLUTOSE) oral gel 15 g, 15 g, Oral, Q15 Min PRN, Cristin Palma MD    glucagon (GLUCAGEN) injection 1 mg, 1 mg, Intramuscular, Q15 Min PRN, Cristin Palma MD    heparin (porcine) 5000 UNIT/ML injection 5,000 Units, 5,000 Units, Subcutaneous, Q8H, Yanet Reed MD, 5,000 Units at 09/13/23 1432    Insulin Lispro (humaLOG) injection 2-7 Units, 2-7 Units, Subcutaneous, 4x Daily AC & at Bedtime, Cristin Palma MD, 2 Units at 09/13/23 0808    Magnesium Standard Dose Replacement - Follow Nurse / BPA Driven Protocol, , Does not apply, PRN, Minerva, Cristin CAPPS MD    nitroglycerin (NITROSTAT) SL tablet 0.4 mg, 0.4 mg, Sublingual, Q5 Min PRN, Cristin Palma MD    ondansetron (ZOFRAN) tablet 4 mg, 4 mg, Oral, Q6H PRN **OR** ondansetron (ZOFRAN) injection 4 mg, 4 mg, Intravenous, Q6H PRN, Yanet Reed MD, 4 mg at 09/13/23 0947    Pharmacy to dose vancomycin, , Does not apply, Continuous PRN, Sheri Juares, DO    Phosphorus Replacement - Follow Nurse / BPA Driven Protocol, , Does not apply, PRN, Day, Cristin S,  MD    piperacillin-tazobactam (ZOSYN) 3.375 g in iso-osmotic dextrose 50 ml (premix), 3.375 g, Intravenous, Q8H, Sheri Juares DO, 3.375 g at 09/13/23 1432    Potassium Replacement - Follow Nurse / BPA Driven Protocol, , Does not apply, PRN, Cristin Palma MD    Sodium Chloride (PF) 0.9 % 10 mL, 10 mL, Intravenous, PRN, Nakul Vasquez MD    sodium chloride 0.45 % infusion, 100 mL/hr, Intravenous, Continuous, Minerva, Cristin CAPPS MD, Last Rate: 100 mL/hr at 09/13/23 0645, 100 mL/hr at 09/13/23 0645    sodium chloride 0.9 % flush 10 mL, 10 mL, Intravenous, Q12H, Cristin Palma MD, 10 mL at 09/13/23 0020    sodium chloride 0.9 % flush 10 mL, 10 mL, Intravenous, PRN, Cristin Palma MD    sodium chloride 0.9 % infusion 40 mL, 40 mL, Intravenous, PRN, Cristin Palma MD    vancomycin IVPB 1500 mg in 0.9% NaCl (Premix) 500 mL, 15 mg/kg, Intravenous, Q24H, Sheri Juares DO    Antibiotics:  Anti-Infectives (From admission, onward)      Ordered     Dose/Rate Route Frequency Start Stop    09/12/23 2159  vancomycin IVPB 1500 mg in 0.9% NaCl (Premix) 500 mL        Ordering Provider: Sheri Juares DO    15 mg/kg × 93 kg  333.3 mL/hr over 90 Minutes Intravenous Every 24 Hours 09/13/23 1800 09/17/23 1759    09/12/23 2145  piperacillin-tazobactam (ZOSYN) 3.375 g in iso-osmotic dextrose 50 ml (premix)        Ordering Provider: Sheri Juares DO    3.375 g  over 4 Hours Intravenous Every 8 Hours 09/13/23 0500 09/18/23 0459    09/12/23 2159  vancomycin 1750 mg/500 mL 0.9% NS IVPB ([facility])        Ordering Provider: Sheri Juares DO    20 mg/kg × 93 kg  over 105 Minutes Intravenous Once 09/12/23 2215 09/13/23 0023    09/12/23 2145  piperacillin-tazobactam (ZOSYN) 3.375 g in iso-osmotic dextrose 50 ml (premix)        Ordering Provider: Sheri Juares DO    3.375 g  over 30 Minutes Intravenous Once 09/12/23 2201 09/12/23 2308    09/12/23 2145  Pharmacy to dose vancomycin        Ordering Provider: Sheri Juares DO     Does not  apply Continuous PRN 23              Review of Systems:  Constitutional-- + Fever, chills & sweats.  Appetite poor although now improved, + malaise and fatigue.  HEENT-- No new vision, hearing or throat complaints.  No epistaxis or oral sores.  Denies odynophagia or dysphagia. No headache, photophobia or neck stiffness.  CV-- No chest pain, palpitation or syncope  Resp-- No SOB or hemoptysis  Occasional mild nonproductive cough  GI- + nausea &vomiting which are much improved. Ongoing diarrhea which may be improving  No hematochezia, melena, or hematemesis. Denies jaundice or chronic liver disease.  -- No dysuria, hematuria, or flank pain.  Denies hesitancy, urgency or flank pain.  Lymph- no swollen lymph nodes in neck/axilla or groin.   Heme- No active bruising or bleeding; no Hx of DVT or PE.  MS-- no swelling or pain in the bones or joints of arms/legs.  No new back pain.  Neuro-- No acute focal weakness or numbness in the arms or legs.  No seizures.  Skin--No rashes or lesions      Physical Exam:   Vital Signs  Temp (24hrs), Av.8 °F (37.7 °C), Min:98.9 °F (37.2 °C), Max:101.3 °F (38.5 °C)    Temp  Min: 98.9 °F (37.2 °C)  Max: 101.3 °F (38.5 °C)  BP  Min: 102/63  Max: 131/74  Pulse  Min: 90  Max: 105  Resp  Min: 18  Max: 18  SpO2  Min: 95 %  Max: 97 %    GENERAL: Awake and alert, in no acute distress.   HEENT: Normocephalic, atraumatic.  PERRL. EOMI. No conjunctival injection. No icterus. Oropharynx clear   NECK: Supple without nuchal rigidity. No mass.  LYMPH: No cervical lymphadenopathy.  HEART: RRR; No murmur, rubs, gallops.   LUNGS: Clear to auscultation bilaterally without wheezing, rales, rhonchi. Normal respiratory effort. Nonlabored. No dullness.  ABDOMEN: Soft, nontender, nondistended. Positive bowel sounds. No rebound or guarding. NO mass or HSM.  EXT:  No cyanosis, clubbing or edema. No cord.  :  Without Roper catheter.  MSK: No joint effusions or erythema  SKIN: Warm  and dry without cutaneous eruptions on Inspection/palpation.    NEURO: Oriented to PPT.  Motor 5/5 strength  PSYCHIATRIC: Normal insight and judgment. Cooperative with PE    Laboratory Data    Results from last 7 days   Lab Units 09/13/23  0416 09/12/23  1554   WBC 10*3/mm3 8.55 10.25   HEMOGLOBIN g/dL 12.5* 14.9   HEMATOCRIT % 36.4* 44.7   PLATELETS 10*3/mm3 143 155     Results from last 7 days   Lab Units 09/13/23  1159   SODIUM mmol/L 133*   POTASSIUM mmol/L 3.7   CHLORIDE mmol/L 99   CO2 mmol/L 24.0   BUN mg/dL 15   CREATININE mg/dL 1.17   GLUCOSE mg/dL 169*   CALCIUM mg/dL 8.0*     Results from last 7 days   Lab Units 09/13/23  0416   ALK PHOS U/L 72   BILIRUBIN mg/dL 1.1   ALT (SGPT) U/L 46*   AST (SGOT) U/L 51*         Results from last 7 days   Lab Units 09/13/23  1159   CRP mg/dL 10.78*     Results from last 7 days   Lab Units 09/13/23  0416   LACTATE mmol/L 2.2*             Estimated Creatinine Clearance: 70.4 mL/min (by C-G formula based on SCr of 1.17 mg/dL).      Microbiology:  No results found for: ACANTHNAEG, AFBCX, BPERTUSSISCX, BLOODCX  No results found for: BCIDPCR, CXREFLEX, CSFCX, CULTURETIS  No results found for: CULTURES, HSVCX, URCX  No results found for: EYECULTURE, GCCX, HSVCULTURE, LABHSV  No results found for: LEGIONELLA, MRSACX, MUMPSCX, MYCOPLASCX  No results found for: NOCARDIACX, STOOLCX  No results found for: THROATCX, UNSTIMCULT, URINECX, CULTURE, VZVCULTUR  No results found for: VIRALCULTU, WOUNDCX        Radiology:  Imaging Results (Last 72 Hours)       Procedure Component Value Units Date/Time    XR Chest 1 View [[id number]] Collected: 09/12/23 2113     Updated: 09/12/23 2117    Narrative:      XR CHEST 1 VW    Date of Exam: 9/12/2023 9:02 PM EDT    Indication: fever    Comparison: None available.    Findings:  Cardiomediastinal silhouette is within normal limits. Mild right hemidiaphragm elevation. No focal consolidation or overt pulmonary edema. No pleural effusion or  pneumothorax. Osseous structures are intact.      Impression:      Impression:  No evidence of acute cardiopulmonary disease.    Electronically Signed: Desmond Solis MD    9/12/2023 9:14 PM EDT    Workstation ID: [id number]    CT Abdomen Pelvis With Contrast [[id number]] Collected: 09/12/23 1648     Updated: 09/12/23 1701    Narrative:      CT ABDOMEN PELVIS W CONTRAST    Date of Exam: 9/12/2023 4:37 PM EDT    Indication: N/V/D, weight loss.    Comparison: None available.    Technique: Axial CT images were obtained of the abdomen and pelvis following the uneventful intravenous administration of 85 mL Isovue-300. Reconstructed coronal and sagittal images were also obtained. Automated exposure control and iterative   construction methods were used.    Findings:    Lower thorax: Lung bases are clear.    Liver: No focal hepatic lesion on this single phase exam. Hepatic steatosis.    Gallbladder and bile ducts: Gallbladder is unremarkable. No biliary ductal dilatation.    Pancreas: No pancreatic duct dilation. No surrounding inflammation.    Spleen: Spleen is mildly enlarged measuring 14 cm.    Adrenal glands: No discrete adrenal nodule.    Kidneys: No hydronephrosis. No suspicious renal lesions.    Urinary bladder: Unremarkable.    Reproductive organs: Prostate calcifications.    Stomach and bowel: No evidence of bowel obstruction. Diverticulosis. Normal appendix.    Lymph nodes: No pathologically enlarged lymph nodes.    Vessels: No abdominal aortic aneurysm.    Peritoneum and retroperitoneum: No free air or free fluid.    Soft tissues: Unremarkable.    Osseous structures: No suspicious osseous lesions.      Impression:      Impression:    Hepatic steatosis and mild splenomegaly.    Otherwise, no evidence of acute abnormality in the abdomen or pelvis.    Electronically Signed: Desmond Solis MD    9/12/2023 4:58 PM EDT    Workstation ID: [id number]              Impression:       -- FUO of ~ 1 month duration in a patient with  extensive outdoor exposure.   The absence of recent recognized tick bites does not r/o rickettsial or borrelia infections. If his illness has a single cause then RMSF or ehrlichia are less likely since these infections often result in a fulminant illness. Q fever or Lyme could have a more prolonged course although his symptoms do not fit the classic presentation.  His platelets are at the lower range of normal and are falling over 24 hours  Viral illness such as EBV, CMV etc are possible and could possibly account for his symptoms    -- Nausea and vomiting- improved Lipase normal    -- Diarrhea- persists  GI panel and c diff negative     -- History of nephrolithiasis and prior urologic procedure  No hydronephrosis on 9/12 CT   Mild pyuria of unclear significance     PLAN/RECOMMENDATIONS:   Thank you for asking us to see Peter Faye, I recommend the following:    -- Agree with vanc and zosyn while blood cultures pending    If blood cultures remain negative recommend stopping vancomycin    -- US abdomen pending    -- empiric doxycycline     -- serologies for zoonoses    -- serologies for viral pathogens     -- await blood and urine culture results       Eva Whiteside MD  9/13/2023  15:29 EDT

## 2023-09-13 NOTE — PLAN OF CARE
Goal Outcome Evaluation:  Plan of Care Reviewed With: patient, spouse        Progress: no change  Outcome Evaluation: Pt presents with decreased balance and  decreased functional independence. Pt ambulated 100ft with CGA, unsupported. Pt demo 2 LOB while ambulating this session, recommend using SPC next session. Recommend continued skilled IP PT interventions. Recommend D/C home with assist when medically appropriate.      Anticipated Discharge Disposition (PT): home with assist

## 2023-09-13 NOTE — PROGRESS NOTES
"Pharmacy Consult-Vancomycin Dosing  Peter Faye is a  68 y.o. male receiving vancomycin therapy.     Indication: bandemia  Consulting Provider: Hospitalist  ID Consult:     Goal AUC: 400 - 600 mg/L*hr    Current Antimicrobial Therapy  Anti-Infectives (From admission, onward)      Ordered     Dose/Rate Route Frequency Start Stop    09/12/23 2159  vancomycin IVPB 1500 mg in 0.9% NaCl (Premix) 500 mL        Ordering Provider: Sheri Juares G, DO    15 mg/kg × 93 kg  333.3 mL/hr over 90 Minutes Intravenous Every 24 Hours 09/13/23 1800 09/17/23 1759 09/12/23 2145  piperacillin-tazobactam (ZOSYN) 3.375 g in iso-osmotic dextrose 50 ml (premix)        Ordering Provider: BlanquitaMakenna lozanosie G, DO    3.375 g  over 4 Hours Intravenous Every 8 Hours 09/13/23 0500 09/18/23 0459    09/12/23 2159  vancomycin 1750 mg/500 mL 0.9% NS IVPB ([facility])        Ordering Provider: Makenna Juaressie G, DO    20 mg/kg × 93 kg  over 105 Minutes Intravenous Once 09/12/23 2215 09/12/23 2145  piperacillin-tazobactam (ZOSYN) 3.375 g in iso-osmotic dextrose 50 ml (premix)        Ordering Provider: Makenna Juaressie G, DO    3.375 g  over 30 Minutes Intravenous Once 09/12/23 2201 09/12/23 2145  Pharmacy to dose vancomycin        Ordering Provider: Makenna Juaressie G, DO     Does not apply Continuous PRN 09/12/23 2144 09/17/23 2143            Allergies  Allergies as of 09/12/2023    (No Known Allergies)       Labs    Results from last 7 days   Lab Units 09/12/23  1559 09/12/23  1554   BUN mg/dL  --  18   CREATININE mg/dL 1.20 1.22       Results from last 7 days   Lab Units 09/12/23  1554   WBC 10*3/mm3 10.25       Evaluation of Dosing     Last Dose Received in the ED/Outside Facility: No  Is Patient on Dialysis or Renal Replacement: No    Ht - 180.3 cm (71\")  Wt - 93 kg (205 lb)    Estimated Creatinine Clearance: 68.7 mL/min (by C-G formula based on SCr of 1.2 mg/dL).    Intake & Output (last 3 days)       None            Microbiology and " Radiology  Microbiology Results (last 10 days)       Procedure Component Value - Date/Time    COVID PRE-OP / PRE-PROCEDURE SCREENING ORDER (NO ISOLATION) - Swab, Nasopharynx [[id number]]  (Normal) Collected: 09/12/23 1554    Lab Status: Final result Specimen: Swab from Nasopharynx Updated: 09/12/23 1631    Narrative:      The following orders were created for panel order COVID PRE-OP / PRE-PROCEDURE SCREENING ORDER (NO ISOLATION) - Swab, Nasopharynx.  Procedure                               Abnormality         Status                     ---------                               -----------         ------                     COVID-19 and FLU A/B PCR...[[id number]]  Normal              Final result                 Please view results for these tests on the individual orders.    COVID-19 and FLU A/B PCR - Swab, Nasopharynx [[id number]]  (Normal) Collected: 09/12/23 1554    Lab Status: Final result Specimen: Swab from Nasopharynx Updated: 09/12/23 1631     COVID19 Not Detected     Influenza A PCR Not Detected     Influenza B PCR Not Detected    Narrative:      Fact sheet for providers: https://www.fda.gov/media/836493/download    Fact sheet for patients: https://www.fda.gov/media/498568/download    Test performed by PCR.            Reported Vancomycin Levels                         [employer] AUC Calculation:    Current AUC:   -- mg/L*hr    Predicted Steady State AUC on Current Dose: -- mg/L*hr  _________________________________    Predicted Steady State AUC on New Dose:   461 mg/L*hr    Assessment/Plan:  1. Pharmacy to dose vancomycin for bandemia.   2. Patient received a loading dose of vancomycin 1750mg IV x1.  3. Will start on a maintenance dose of vancomycin 1500mg IV Q24H.  4. Vancomycin random scheduled for 9/14 with AM labs.  5. Monitor renal function, cultures and sensitivities, and clinical status, and adjust regimen as necessary.  Pharmacy will continue to follow.    Minna Goodman, PharmD, [facility]  9/12/2023  22:00  EDT

## 2023-09-13 NOTE — H&P
[facility] Medicine Services  HISTORY AND PHYSICAL    Patient Name: Peter Faye  : 1955  MRN: [id number]  Primary Care Physician: Provider, No Known  Date of admission: 2023      Subjective   Subjective     Chief Complaint:   fever    HPI:  Peter Faye is a 68 y.o. male with hx only remarkable for kidney stones who presents now w/ one month hx of n/v/d associated with fever and chills.  Fevers have been up to 102.3.  Has v/d with most every po intake other than popsicles and icecream.  Notes he has lost 8 lbs in last 2 wks.  Has some right flank/back pain but feels it is related to lying in bed so much.  No midline spine tenderness.  No recent abx and no meds other than occasional tylenol.  Also has loss of bladder control recently.  No cough/shortness of breath/chest pain.  No leg swelling or rash.  No dysuria but notes frequency.  No rectal pain but wife notes when he has diarrhea it feels like knife stabbing him.  No recent travel and no unusual foods or animal exposures. Prior to this he was very active but now states he cannot get out of bed secondary to weakness or n/v/d or urinary incontinence.        Personal History     Past Medical History:   Diagnosis Date    Nephrolithiasis              No past surgical history on file.    Family History: Family history is unknown by patient.     Social History:  reports that he has never smoked. He has never used smokeless tobacco. He reports that he does not currently use alcohol. He reports that he does not use drugs.  Social History     Social History Narrative    Not on file       Medications:  Available home medication information reviewed.  (Not in a hospital admission)      No Known Allergies    Objective   Objective     Vital Signs:   Temp:  [98.1 °F (36.7 °C)] 98.1 °F (36.7 °C)  Heart Rate:  [100-106] 104  Resp:  [20] 20  BP: (116-131)/(72-74) 131/74       Physical Exam    Gen; alert, oriented, nad  Heent; perrla, eomi,  pasty mm  Cv; rr w/ tachycardia, no mrg  L; ctab, no wheeze/cracklesa  Abd; mild right flank ttp, no r/g, soft, +Bs  Ext; no cce, palpable pulses  Skin; cdi, hot to touch  Neuro; grossly intact  Psych; mood and affect appropriate    Result Review:  I have personally reviewed the results from the time of this admission to 9/12/2023 22:30 EDT and agree with these findings:  [x]  Laboratory list / accordion  [x]  Microbiology  [x]  Radiology  [x]  EKG/Telemetry   []  Cardiology/Vascular   []  Pathology  []  Old records  []  Other:  Most notable findings include:          LAB RESULTS:      Lab 09/12/23  2158 09/12/23  1554   WBC  --  10.25   HEMOGLOBIN  --  14.9   HEMATOCRIT  --  44.7   PLATELETS  --  155   NEUTROS ABS  --  7.18*   EOS ABS  --  0.00   MCV  --  89.2   PROCALCITONIN  --  0.73*   LACTATE 1.7 3.2*         Lab 09/12/23  1559 09/12/23  1554   SODIUM  --  126*   POTASSIUM  --  4.0   CHLORIDE  --  91*   CO2  --  23.0   ANION GAP  --  12.0   BUN  --  18   CREATININE 1.20 1.22   EGFR  --  64.6   GLUCOSE  --  303*   CALCIUM  --  9.1         Lab 09/12/23  1554   TOTAL PROTEIN 8.7*   ALBUMIN 3.5   GLOBULIN 5.2   ALT (SGPT) 56*   AST (SGOT) 58*   BILIRUBIN 1.3*   ALK PHOS 88   LIPASE 32                     UA          9/12/2023    15:54   Urinalysis   Squamous Epithelial Cells, UA 3-6    Specific Gravity, UA 1.025    Ketones, UA Negative    Blood, UA Negative    Leukocytes, UA Trace    Nitrite, UA Negative    RBC, UA 7-12    WBC, UA 6-12    Bacteria, UA 2+        Microbiology Results (last 10 days)       Procedure Component Value - Date/Time    COVID PRE-OP / PRE-PROCEDURE SCREENING ORDER (NO ISOLATION) - Swab, Nasopharynx [[id number]]  (Normal) Collected: 09/12/23 1554    Lab Status: Final result Specimen: Swab from Nasopharynx Updated: 09/12/23 1631    Narrative:      The following orders were created for panel order COVID PRE-OP / PRE-PROCEDURE SCREENING ORDER (NO ISOLATION) - Swab, Nasopharynx.  Procedure                                Abnormality         Status                     ---------                               -----------         ------                     COVID-19 and FLU A/B PCR...[[id number]]  Normal              Final result                 Please view results for these tests on the individual orders.    COVID-19 and FLU A/B PCR - Swab, Nasopharynx [[id number]]  (Normal) Collected: 09/12/23 1554    Lab Status: Final result Specimen: Swab from Nasopharynx Updated: 09/12/23 1631     COVID19 Not Detected     Influenza A PCR Not Detected     Influenza B PCR Not Detected    Narrative:      Fact sheet for providers: https://www.fda.gov/media/733246/download    Fact sheet for patients: https://www.fda.gov/media/352537/download    Test performed by PCR.            CT Abdomen Pelvis With Contrast    Result Date: 9/12/2023  CT ABDOMEN PELVIS W CONTRAST Date of Exam: 9/12/2023 4:37 PM EDT Indication: N/V/D, weight loss. Comparison: None available. Technique: Axial CT images were obtained of the abdomen and pelvis following the uneventful intravenous administration of 85 mL Isovue-300. Reconstructed coronal and sagittal images were also obtained. Automated exposure control and iterative construction methods were used. Findings: Lower thorax: Lung bases are clear. Liver: No focal hepatic lesion on this single phase exam. Hepatic steatosis. Gallbladder and bile ducts: Gallbladder is unremarkable. No biliary ductal dilatation. Pancreas: No pancreatic duct dilation. No surrounding inflammation. Spleen: Spleen is mildly enlarged measuring 14 cm. Adrenal glands: No discrete adrenal nodule. Kidneys: No hydronephrosis. No suspicious renal lesions. Urinary bladder: Unremarkable. Reproductive organs: Prostate calcifications. Stomach and bowel: No evidence of bowel obstruction. Diverticulosis. Normal appendix. Lymph nodes: No pathologically enlarged lymph nodes. Vessels: No abdominal aortic aneurysm. Peritoneum and retroperitoneum: No  free air or free fluid. Soft tissues: Unremarkable. Osseous structures: No suspicious osseous lesions.     Impression: Impression: Hepatic steatosis and mild splenomegaly. Otherwise, no evidence of acute abnormality in the abdomen or pelvis. Electronically Signed: Desmond Solis MD  9/12/2023 4:58 PM EDT  Workstation ID: [id number]    XR Chest 1 View    Result Date: 9/12/2023  XR CHEST 1 VW Date of Exam: 9/12/2023 9:02 PM EDT Indication: fever Comparison: None available. Findings: Cardiomediastinal silhouette is within normal limits. Mild right hemidiaphragm elevation. No focal consolidation or overt pulmonary edema. No pleural effusion or pneumothorax. Osseous structures are intact.     Impression: Impression: No evidence of acute cardiopulmonary disease. Electronically Signed: Desmond Solis MD  9/12/2023 9:14 PM EDT  Workstation ID: [id number]         Assessment & Plan   Assessment & Plan     Active Hospital Problems    Diagnosis  POA    **Fever [R50.9]  Yes    Acute UTI (urinary tract infection) [N39.0]  Yes    Hyponatremia [E87.1]  Yes    Lactic acidosis [E87.20]  Yes    Hyperglycemia [R73.9]  Yes    Elevated LFTs [R79.89]  Yes    Bandemia [D72.825]  Yes     67 y/o male w/ hx of nephrolithiasis here now w/  FUO;  -pt does have UTI but does not account necessarily for his v/d which have been present for a month  -cxr negative  -abd CT negative other than fatty liver and mild splenomegaly  -does not have sx to suggest prostatitis  -no rash  -will check hep panel, GBUS, stool studies  -consider ID/GI consult   -change abx to vanc/zosyn and check blood cultures as well as pt with bandemia, fever in ER, lactic acidosis, and borderline BP (99/70 when in room with pt but subsequent BP improved)  2. Hyponatremia;  -check tsh, cortisol  -may be related to fluid losses, hyperglycemia  3. Elevated lft's  -mild but check hep panel, GB and liver US  4. Lactic acidosis  -ivf's and trend  5. Hyperglycemia  -ivf's, ssi, a1c         PT/OT eval        DVT prophylaxis:  heparin    CODE STATUS:     Code Status and Medical Interventions:   Ordered at: 09/12/23 2226     Code Status (Patient has no pulse and is not breathing):    CPR (Attempt to Resuscitate)     Medical Interventions (Patient has pulse or is breathing):    Full Support       Expected Discharge  tbd  Expected discharge date/ time has not been documented.     Cristin Palma MD  09/12/23  Electronically signed by Cristin Palma MD, 09/12/23, 10:45 PM EDT.

## 2023-09-13 NOTE — CASE MANAGEMENT/SOCIAL WORK
Discharge Planning Assessment  [facility]     Patient Name: Peter Faye  MRN: [id number]  Today's Date: 9/13/2023    Admit Date: 9/12/2023    Plan: home   Discharge Needs Assessment       Row Name 09/13/23 0906       Living Environment    People in Home spouse    Name(s) of People in Home wife, Grace    Current Living Arrangements home    Primary Care Provided by self       Transition Planning    Patient/Family Anticipates Transition to home with family       Discharge Needs Assessment    Readmission Within the Last 30 Days no previous admission in last 30 days    Equipment Currently Used at Home none    Concerns to be Addressed discharge planning;basic needs                   Discharge Plan       Row Name 09/13/23 0906       Plan    Plan home    Patient/Family in Agreement with Plan yes    Plan Comments I met with this patient and his wife at the bedside. They live in [facility]. He is independent with activities of daily living and mobility. PT and OT have been consulted. He does not have a PCP currently and the wife requested that CM give her the number to secure a PCP appointment or she is also trying to get her own MD to take him as a new patient. CM gave her the number to call to make a PCP appointment. He anticipates returning home after this hospitalization and his wife can transport. Case management will continue to follow his plan of care and assist with any discharge planning needs.    Final Discharge Disposition Code 01 - home or self-care                  Continued Care and Services - Admitted Since 9/12/2023    Coordination has not been started for this encounter.       Expected Discharge Date and Time       Expected Discharge Date Expected Discharge Time    Sep 20, 2023            Demographic Summary       Row Name 09/13/23 0905       General Information    General Information Comments I confirmed that this patient does not have a PCP. CM gave phone number to patient to set up PCP appt per  wife's request. His insurance is Medicare AB                   Functional Status       Row Name 09/13/23 0906       Functional Status, IADL    Medications independent    Meal Preparation independent    Housekeeping independent    Laundry independent    Shopping independent                   Psychosocial    No documentation.                  Abuse/Neglect    No documentation.                  Legal    No documentation.                  Substance Abuse    No documentation.                  Patient Forms    No documentation.                     Robina Mejia RN

## 2023-09-13 NOTE — THERAPY EVALUATION
Patient Name: ePter Faye  : 1955    MRN: [id number]                              Today's Date: 2023       Admit Date: 2023    Visit Dx:     ICD-10-CM ICD-9-CM   1. Nausea vomiting and diarrhea  R11.2 787.91    R19.7 787.01   2. Bandemia  D72.825 288.66   3. Hyponatremia  E87.1 276.1   4. Fever, unspecified fever cause  R50.9 780.60   5. Hyperglycemia  R73.9 790.29     Patient Active Problem List   Diagnosis    Fever    Acute UTI (urinary tract infection)    Hyponatremia    Lactic acidosis    Hyperglycemia    Elevated LFTs    Bandemia     Past Medical History:   Diagnosis Date    Nephrolithiasis      History reviewed. No pertinent surgical history.   General Information       Row Name 23 1307          Physical Therapy Time and Intention    Document Type evaluation  -AE     Mode of Treatment physical therapy  -AE       Row Name 23 1307          General Information    Patient Profile Reviewed yes  -AE     Prior Level of Function independent:;all household mobility;gait;transfer;bed mobility;ADL's;dressing;bathing  -AE     Existing Precautions/Restrictions fall;other (see comments)  nausea  -AE     Barriers to Rehab none identified  -AE       Row Name 23 1307          Living Environment    People in Home spouse  -AE       Row Name 23 1307          Home Main Entrance    Number of Stairs, Main Entrance one  -AE     Stair Railings, Main Entrance none  -AE       Row Name 23 1307          Stairs Within Home, Primary    Number of Stairs, Within Home, Primary none  -AE     Stair Railings, Within Home, Primary none  -AE       Row Name 23 1307          Cognition    Orientation Status (Cognition) oriented x 4  -AE       Row Name 23 1307          Safety Issues, Functional Mobility    Safety Issues Affecting Function (Mobility) awareness of need for assistance;insight into deficits/self-awareness;safety precaution awareness;sequencing abilities  -AE     Impairments  Affecting Function (Mobility) balance;endurance/activity tolerance  -AE               User Key  (r) = Recorded By, (t) = Taken By, (c) = Cosigned By      Initials Name Provider Type    AE Fernando Sauceda, PT Physical Therapist                   Mobility       Row Name 09/13/23 1308          Bed Mobility    Bed Mobility supine-sit;sit-supine  -AE     Supine-Sit [place] (Bed Mobility) standby assist  -AE     Sit-Supine [place] (Bed Mobility) standby assist  -AE     Comment, (Bed Mobility) No cues required for bed mobility.  -AE       Row Name 09/13/23 1308          Transfers    Comment, (Transfers) VCs for hand placement and sequencing. No LOB with initial STS.  -AE       Row Name 09/13/23 1308          Sit-Stand Transfer    Sit-Stand [place] (Transfers) contact guard  -AE       Row Name 09/13/23 1308          Gait/Stairs (Locomotion)    [place] Level (Gait) contact guard;1 person assist;verbal cues  -AE     Distance in Feet (Gait) 100  -AE     Deviations/Abnormal Patterns (Gait) bilateral deviations;gait speed decreased;stride length decreased  -AE     Bilateral Gait Deviations heel strike decreased  -AE     Comment, (Gait/Stairs) Pt demo step through gait pattern with slowed summer, decreased step length, and decreased balance. Pt demo 2 LOB during session, one LOB resulted in patient leaning against a wall to improve balance. Educated patient on using SPC for longer ambulation distances d/t decreased balance. Further distance limited by nausea.  -AE               User Key  (r) = Recorded By, (t) = Taken By, (c) = Cosigned By      Initials Name Provider Type    Fernando Stoner PT Physical Therapist                   Obj/Interventions       Row Name 09/13/23 1312          Range of Motion Comprehensive    General Range of Motion bilateral lower extremity ROM WFL  -AE       Row Name 09/13/23 1312          Strength Comprehensive (MMT)    General Manual Muscle Testing (MMT) Assessment no  strength deficits identified  -AE       Row Name 09/13/23 1312          Balance    Balance Assessment sitting static balance;sit to stand dynamic balance;standing static balance;sitting dynamic balance;standing dynamic balance  -AE     Static Sitting Balance independent  -AE     Dynamic Sitting Balance standby assist  -AE     Position, Sitting Balance unsupported;sitting edge of bed  -AE     Sit to Stand Dynamic Balance contact guard;1-person assist;verbal cues  -AE     Static Standing Balance contact guard  -AE     Dynamic Standing Balance contact guard  -AE     Position/Device Used, Standing Balance unsupported  -AE       Row Name 09/13/23 1312          Sensory Assessment (Somatosensory)    Sensory Assessment (Somatosensory) LE sensation intact  -AE               User Key  (r) = Recorded By, (t) = Taken By, (c) = Cosigned By      Initials Name Provider Type    AE Fernando Sauceda, PT Physical Therapist                   Goals/Plan       Row Name 09/13/23 1320          Bed Mobility Goal 1 (PT)    Activity/Assistive Device (Bed Mobility Goal 1, PT) sit to supine/supine to sit  -AE     [place] Level/Cues Needed (Bed Mobility Goal 1, PT) independent  -AE     Time Frame (Bed Mobility Goal 1, PT) long term goal (LTG);10 days  -AE     Progress/Outcomes (Bed Mobility Goal 1, PT) new goal  -AE       Row Name 09/13/23 1320          Transfer Goal 1 (PT)    Activity/Assistive Device (Transfer Goal 1, PT) sit-to-stand/stand-to-sit;bed-to-chair/chair-to-bed  -AE     [place] Level/Cues Needed (Transfer Goal 1, PT) independent  -AE     Time Frame (Transfer Goal 1, PT) long term goal (LTG);10 days  -AE     Progress/Outcome (Transfer Goal 1, PT) new goal  -AE       Row Name 09/13/23 1320          Gait Training Goal 1 (PT)    Activity/Assistive Device (Gait Training Goal 1, PT) gait (walking locomotion);assistive device use  -AE     [place] Level (Gait Training Goal 1, PT) modified independence  -AE     Distance (Gait  Training Goal 1, PT) 400ft  -AE     Time Frame (Gait Training Goal 1, PT) long term goal (LTG);10 days  -AE     Progress/Outcome (Gait Training Goal 1, PT) new goal  -AE       Row Name 09/13/23 1320          Stairs Goal 1 (PT)    Activity/Assistive Device (Stairs Goal 1, PT) ascending stairs;descending stairs;step-to-step  -AE     [place] Level/Cues Needed (Stairs Goal 1, PT) standby assist  -AE     Number of Stairs (Stairs Goal 1, PT) 1  -AE     Time Frame (Stairs Goal 1, PT) long term goal (LTG);10 days  -AE     Progress/Outcome (Stairs Goal 1, PT) new goal  -AE       Row Name 09/13/23 1320          Therapy Assessment/Plan (PT)    Planned Therapy Interventions (PT) balance training;bed mobility training;home exercise program;gait training;patient/family education;postural re-education;ROM (range of motion);stair training;strengthening;transfer training  -AE               User Key  (r) = Recorded By, (t) = Taken By, (c) = Cosigned By      Initials Name Provider Type    AE Fernando Sauceda, PT Physical Therapist                   Clinical Impression       Row Name 09/13/23 1312          Pain    Pretreatment Pain Rating 0/10 - no pain  -AE     Posttreatment Pain Rating 0/10 - no pain  -AE       Row Name 09/13/23 1312          Plan of Care Review    Plan of Care Reviewed With patient;spouse  -AE     Progress no change  -AE     Outcome Evaluation Pt presents with decreased balance and  decreased functional independence. Pt ambulated 100ft with CGA, unsupported. Pt demo 2 LOB while ambulating this session, recommend using SPC next session. Recommend continued skilled IP PT interventions. Recommend D/C home with assist when medically appropriate.  -AE       Row Name 09/13/23 1312          Therapy Assessment/Plan (PT)    Patient/Family Therapy Goals Statement (PT) Get better  -AE     Rehab Potential (PT) good, to achieve stated therapy goals  -AE     Criteria for Skilled Interventions Met (PT) yes  -AE     Therapy  Frequency (PT) daily  -AE       Row Name 09/13/23 1312          Vital Signs    Pre Systolic BP Rehab 121  -AE     Pre Treatment Diastolic BP 72  -AE     Pretreatment Heart Rate (beats/min) 94  -AE     Posttreatment Heart Rate (beats/min) 97  -AE     Pre SpO2 (%) 97  -AE     O2 Delivery Pre Treatment room air  -AE     O2 Delivery Intra Treatment room air  -AE     Post SpO2 (%) 97  -AE     O2 Delivery Post Treatment room air  -AE     Pre Patient Position Supine  -AE     Intra Patient Position Standing  -AE     Post Patient Position Supine  -AE       Row Name 09/13/23 1312          Positioning and Restraints    Pre-Treatment Position in bed  -AE     Post Treatment Position bed  -AE     In Bed notified nsg;supine;call light within reach;encouraged to call for assist;with family/caregiver;side rails up x2  -AE               User Key  (r) = Recorded By, (t) = Taken By, (c) = Cosigned By      Initials Name Provider Type    Fernando Stoner, PT Physical Therapist                   Outcome Measures       Row Name 09/13/23 1322 09/13/23 0800       How much help from another person do you currently need...    Turning from your back to your side while in flat bed without using bedrails? 4  -AE 3  -AN    Moving from lying on back to sitting on the side of a flat bed without bedrails? 4  -AE 3  -AN    Moving to and from a bed to a chair (including a wheelchair)? 3  -AE 3  -AN    Standing up from a chair using your arms (e.g., wheelchair, bedside chair)? 3  -AE 3  -AN    Climbing 3-5 steps with a railing? 3  -AE 3  -AN    To walk in hospital room? 3  -AE 3  -AN    AM-PAC 6 Clicks Score (PT) 20  -AE 18  -AN    Highest level of mobility 6 --> Walked 10 steps or more  -AE 6 --> Walked 10 steps or more  -AN      Row Name 09/13/23 1322          Functional Assessment    Outcome Measure Options AM-PAC 6 Clicks Basic Mobility (PT)  -AE               User Key  (r) = Recorded By, (t) = Taken By, (c) = Cosigned By      Initials Name  Provider Type    AN Arabella Henry, RN Registered Nurse    AE Fernando Sauceda PT Physical Therapist                                 Physical Therapy Education       Title: PT OT SLP Therapies (In Progress)       Topic: Physical Therapy (In Progress)       Point: Mobility training (In Progress)       Learning Progress Summary             Patient Acceptance, E, NR by AE at 9/13/2023 1054                         Point: Home exercise program (Not Started)       Learner Progress:  Not documented in this visit.              Point: Body mechanics (In Progress)       Learning Progress Summary             Patient Acceptance, E, NR by AE at 9/13/2023 1054                         Point: Precautions (In Progress)       Learning Progress Summary             Patient Acceptance, E, NR by AE at 9/13/2023 1054                                         User Key       Initials Effective Dates Name Provider Type Discipline    AE 09/21/21 -  Fernanod Sauceda PT Physical Therapist PT                  PT Recommendation and Plan  Planned Therapy Interventions (PT): balance training, bed mobility training, home exercise program, gait training, patient/family education, postural re-education, ROM (range of motion), stair training, strengthening, transfer training  Plan of Care Reviewed With: patient, spouse  Progress: no change  Outcome Evaluation: Pt presents with decreased balance and  decreased functional independence. Pt ambulated 100ft with CGA, unsupported. Pt demo 2 LOB while ambulating this session, recommend using SPC next session. Recommend continued skilled IP PT interventions. Recommend D/C home with assist when medically appropriate.     Time Calculation:         PT Charges       Row Name 09/13/23 1323             Time Calculation    Start Time 1054  -AE      PT Received On 09/13/23  -AE      PT Goal Re-Cert Due Date 09/23/23  -AE         Timed Charges    65870 - PT Therapeutic Activity Minutes 9  -AE         Untimed Charges     PT Eval/Re-eval Minutes 33  -AE         Total Minutes    Timed Charges Total Minutes 9  -AE      Untimed Charges Total Minutes 33  -AE       Total Minutes 42  -AE                User Key  (r) = Recorded By, (t) = Taken By, (c) = Cosigned By      Initials Name Provider Type    Fernando Stoner, PT Physical Therapist                  Therapy Charges for Today       Code Description Service Date Service Provider Modifiers Qty    92202328538 HC PT THERAPEUTIC ACT EA 15 MIN 9/13/2023 Fernando Sauceda, PT GP 1    53452700246 HC PT EVAL LOW COMPLEXITY 3 9/13/2023 Fernando Sauceda, PT GP 1            PT G-Codes  Outcome Measure Options: AM-PAC 6 Clicks Basic Mobility (PT)  AM-PAC 6 Clicks Score (PT): 20  PT Discharge Summary  Anticipated Discharge Disposition (PT): home with assist    Fernando Sauceda PT  9/13/2023

## 2023-09-13 NOTE — CONSULTS
[facility]    Referring Provider: Yanet Reed MD     PCP: Provider, No Known    Reason for Consultation: Nausea, vomiting and diarrhea     Chief complaint: Recurrent fevers     History of present illness:    ePter Faye is a 68 y.o. male who is admitted with recurrent fevers for 1 month.   He states his initial symptoms began with severe arthralgias.   He then developed a fever of 101-102.    He has night sweats.    He has recurrent nausea, vomiting and nonbloody diarrhea.    These have improved however as he tolerated solid food for dinner last night (turkey and rice) as well as breakfast this morning.    He received a stool softener since arrival secondary to a lack of bowel movement in 4 days.  He subsequently had a loose stool today.       He has had some confusion per his wife whom is at the bedside.   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   His wife however corrects him to state he has been retired for many years.         Allergies:  Patient has no known allergies.    Scheduled Meds:  heparin (porcine), 5,000 Units, Subcutaneous, Q8H  insulin lispro, 2-7 Units, Subcutaneous, 4x Daily AC & at Bedtime  piperacillin-tazobactam, 3.375 g, Intravenous, Q8H  senna-docusate sodium, 2 tablet, Oral, BID  sodium chloride, 10 mL, Intravenous, Q12H  vancomycin, 15 mg/kg, Intravenous, Q24H       Infusions:  Pharmacy to dose vancomycin,   sodium chloride, 100 mL/hr, Last Rate: 100 mL/hr (09/13/23 0645)        PRN Meds:    acetaminophen    senna-docusate sodium **AND** polyethylene glycol **AND** bisacodyl **AND** bisacodyl    Calcium Replacement - Follow Nurse / BPA Driven Protocol    dextrose    dextrose    glucagon (human recombinant)    Magnesium Standard Dose Replacement - Follow Nurse / BPA Driven Protocol    nitroglycerin    ondansetron **OR** ondansetron    Pharmacy to dose vancomycin    Phosphorus Replacement - Follow Nurse / BPA Driven Protocol    Potassium  "Replacement - Follow Nurse / BPA Driven Protocol    Sodium Chloride (PF)    sodium chloride    sodium chloride    Home Meds:  No medications prior to admission.       ROS: Review of Systems   Constitutional:  Positive for fatigue and fever.   HENT: Negative.     Eyes: Negative.    Respiratory: Negative.     Cardiovascular: Negative.    Gastrointestinal:  Positive for diarrhea, nausea and vomiting. Negative for abdominal pain.   Endocrine: Negative.    Genitourinary: Negative.    Musculoskeletal:  Positive for arthralgias.   Skin: Negative.    Allergic/Immunologic: Negative.    Neurological: Negative.    Psychiatric/Behavioral:  Positive for confusion.      PAST MED HX:  Past Medical History:   Diagnosis Date    Nephrolithiasis        PAST SURG HX:  History reviewed. No pertinent surgical history.    FAM HX:  Family History   Family history unknown: Yes     SOC HX:  Social History     Socioeconomic History    Marital status:    Tobacco Use    Smoking status: Never    Smokeless tobacco: Never   Vaping Use    Vaping Use: Never used    Passive vaping exposure: Yes   Substance and Sexual Activity    Alcohol use: Not Currently    Drug use: Never    Sexual activity: Defer     PHYSICAL EXAM  /63 (BP Location: Right arm, Patient Position: Lying)   Pulse 90   Temp 99.6 °F (37.6 °C) (Oral)   Resp 18   Ht 180.3 cm (71\")   Wt 93 kg (205 lb)   SpO2 97%   BMI 28.59 kg/m²   Wt Readings from Last 3 Encounters:   09/12/23 93 kg (205 lb)   ,body mass index is 28.59 kg/m².  Physical Exam  Constitutional:       General: He is not in acute distress.  HENT:      Head: Normocephalic and atraumatic.      Mouth/Throat:      Mouth: Mucous membranes are moist.   Eyes:      General: No scleral icterus.  Cardiovascular:      Rate and Rhythm: Normal rate and regular rhythm.   Pulmonary:      Effort: Pulmonary effort is normal. No respiratory distress.   Abdominal:      General: Bowel sounds are normal. There is no distension. "      Palpations: Abdomen is soft.      Tenderness: There is no abdominal tenderness.   Skin:     General: Skin is warm and dry.   Neurological:      Mental Status: He is alert and oriented to person, place, and time.   Psychiatric:         Behavior: Behavior normal.     Results Review:   I reviewed the patient's new clinical results.    Lab Results   Component Value Date    WBC 8.55 09/13/2023    HGB 12.5 (L) 09/13/2023    HGB 14.9 09/12/2023    HCT 36.4 (L) 09/13/2023    MCV 87.3 09/13/2023     09/13/2023     No results found for: INR    Lab Results   Component Value Date    GLUCOSE 169 (H) 09/13/2023    BUN 15 09/13/2023    CREATININE 1.17 09/13/2023    BCR 12.8 09/13/2023     (L) 09/13/2023    K 3.7 09/13/2023    CO2 24.0 09/13/2023    CALCIUM 8.0 (L) 09/13/2023    ALBUMIN 3.0 (L) 09/13/2023    ALKPHOS 72 09/13/2023    BILITOT 1.1 09/13/2023    ALT 46 (H) 09/13/2023    AST 51 (H) 09/13/2023      Latest Reference Range & Units 09/12/23 15:54   Hep A IgM Non-Reactive  Non-Reactive   Hepatitis B Surface Ag Non-Reactive  Non-Reactive   Hep B Core IgM Non-Reactive  Non-Reactive   Hepatitis C Ab Non-Reactive  Non-Reactive     CT Abdomen/Pelvis with contrast: (as interpreted by radiologist)  Findings:   Lower thorax: Lung bases are clear.   Liver: No focal hepatic lesion on this single phase exam. Hepatic steatosis.   Gallbladder and bile ducts: Gallbladder is unremarkable. No biliary ductal dilatation.   Pancreas: No pancreatic duct dilation. No surrounding inflammation.   Spleen: Spleen is mildly enlarged measuring 14 cm.   Adrenal glands: No discrete adrenal nodule.   Kidneys: No hydronephrosis. No suspicious renal lesions.   Urinary bladder: Unremarkable.   Reproductive organs: Prostate calcifications.   Stomach and bowel: No evidence of bowel obstruction. Diverticulosis. Normal appendix   Lymph nodes: No pathologically enlarged lymph nodes.   Vessels: No abdominal aortic aneurysm.   Peritoneum and  retroperitoneum: No free air or free fluid.   Soft tissues: Unremarkable.   Osseous structures: No suspicious osseous lesions.   IMPRESSION:  Impression:   Hepatic steatosis and mild splenomegaly.   Otherwise, no evidence of acute abnormality in the abdomen or pelvis.    GI panel PCR and C. Difficile toxin are negative.     ASSESSMENTS/PLANS    Recurrent fever   Nausea and vomiting, improved  Diarrhea  Hepatic steatosis on CT.   Mildly elevated LFTs.   Possibly COELHO.   Patient denies any alcohol use.  Altered mental status with intermittent confusion/short term memory loss     Suspect infectious source.    Agree with infectious disease consultation.         >> Will check Strongyloides Ab as well as Celiac markers.     >> Obtain abdominal ultrasound   >> Discontinue stool softeners   >> Will order hepatic work up including EBV/CMV     I discussed the patient's findings and my recommendations with patient and his wife whom is present at the bedside.       JACINDA Rosa  09/13/23  15:31 EDT

## 2023-09-13 NOTE — PROGRESS NOTES
[facility] Services  PROGRESS NOTE    Patient Name: Peter Faye  : 1955  MRN: [id number]    Date of Admission: 2023  Primary Care Physician: Provider, No Known    Subjective   Subjective     CC:  F/u FUO    HPI:  Reports only some improvement in symptoms. Able to eat some breakfast      Objective   Objective     Vital Signs:   Temp:  [98.1 °F (36.7 °C)-101.3 °F (38.5 °C)] 99.6 °F (37.6 °C)  Heart Rate:  [] 90  Resp:  [18-20] 18  BP: (102-131)/(63-75) 102/63     Physical Exam:  Constitutional: No acute distress, awake, alert, lying in bed  HENT: NCAT, mucous membranes moist  Respiratory: Clear to auscultation bilaterally, respiratory effort normal   Cardiovascular: RRR, no murmurs, rubs, or gallops  Gastrointestinal: Positive bowel sounds, soft, nontender, nondistended  Musculoskeletal: No bilateral ankle edema  Psychiatric: Appropriate affect, cooperative  Neurologic: Oriented x 3, hard of hearing, no focal deficits  Skin: No rashes      Results Reviewed:  LAB RESULTS:      Lab 23  2158 23  1554   WBC 8.55  --   --  10.25   HEMOGLOBIN 12.5*  --   --  14.9   HEMATOCRIT 36.4*  --   --  44.7   PLATELETS 143  --   --  155   NEUTROS ABS 5.47  --   --  7.18*   IMMATURE GRANS (ABS) 0.04  --   --   --    LYMPHS ABS 2.25  --   --   --    MONOS ABS 0.77  --   --   --    EOS ABS 0.00  --   --  0.00   MCV 87.3  --   --  89.2   PROCALCITONIN  --   --   --  0.73*   LACTATE 2.2* 1.8 1.7 3.2*         Lab 23  1559 23  1554   SODIUM 132*  --   --  126*   POTASSIUM 4.2  --   --  4.0   CHLORIDE 97*  --   --  91*   CO2 24.0  --   --  23.0   ANION GAP 11.0  --   --  12.0   BUN 15  --   --  18   CREATININE 1.17  --  1.20 1.22   EGFR 67.9  --   --  64.6   GLUCOSE 179*  --   --  303*   CALCIUM 8.2*  --   --  9.1   MAGNESIUM 2.1  --   --   --    PHOSPHORUS 2.2*  --   --   --    HEMOGLOBIN A1C  --  8.00*   --   --    TSH 0.891  --   --   --          Lab 09/13/23  0416 09/12/23  1554   TOTAL PROTEIN 7.3 8.7*   ALBUMIN 3.0* 3.5   GLOBULIN 4.3 5.2   ALT (SGPT) 46* 56*   AST (SGOT) 51* 58*   BILIRUBIN 1.1 1.3*   ALK PHOS 72 88   LIPASE  --  32                     Brief Urine Lab Results  (Last result in the past 365 days)        Color   Clarity   Blood   Leuk Est   Nitrite   Protein   CREAT   Urine HCG        09/12/23 1554 Dark Yellow   Clear   Negative   Trace   Negative   100 mg/dL (2+)                   Microbiology Results Abnormal       Procedure Component Value - Date/Time    Gastrointestinal Panel, PCR - Stool, Per Rectum [[id number]]  (Normal) Collected: 09/13/23 0920    Lab Status: Final result Specimen: Stool from Per Rectum Updated: 09/13/23 1110     Campylobacter Not Detected     Plesiomonas shigelloides Not Detected     Salmonella Not Detected     Vibrio Not Detected     Vibrio cholerae Not Detected     Yersinia enterocolitica Not Detected     Enteroaggregative E. coli (EAEC) Not Detected     Enteropathogenic E. coli (EPEC) Not Detected     Enterotoxigenic E. coli (ETEC) lt/st Not Detected     Shiga-like toxin-producing E. coli (STEC) stx1/stx2 Not Detected     Shigella/Enteroinvasive E. coli (EIEC) Not Detected     Cryptosporidium Not Detected     Cyclospora cayetanensis Not Detected     Entamoeba histolytica Not Detected     Giardia lamblia Not Detected     Adenovirus F40/41 Not Detected     Astrovirus Not Detected     Norovirus GI/GII Not Detected     Rotavirus A Not Detected     Sapovirus (I, II, IV or V) Not Detected    Clostridioides difficile Toxin - Stool, Per Rectum [[id number]]  (Normal) Collected: 09/13/23 0920    Lab Status: Final result Specimen: Stool from Per Rectum Updated: 09/13/23 1046    Narrative:      The following orders were created for panel order Clostridioides difficile Toxin - Stool, Per Rectum.  Procedure                               Abnormality         Status                      ---------                               -----------         ------                     Clostridioides difficile...[[id number]]  Normal              Final result                 Please view results for these tests on the individual orders.    Clostridioides difficile Toxin, PCR - Stool, Per Rectum [[id number]]  (Normal) Collected: 09/13/23 0920    Lab Status: Final result Specimen: Stool from Per Rectum Updated: 09/13/23 1046     Toxigenic C. difficile by PCR Not Detected    Narrative:      The result indicates the absence of toxigenic C. difficile from stool specimen.     COVID PRE-OP / PRE-PROCEDURE SCREENING ORDER (NO ISOLATION) - Swab, Nasopharynx [[id number]]  (Normal) Collected: 09/12/23 1554    Lab Status: Final result Specimen: Swab from Nasopharynx Updated: 09/12/23 1631    Narrative:      The following orders were created for panel order COVID PRE-OP / PRE-PROCEDURE SCREENING ORDER (NO ISOLATION) - Swab, Nasopharynx.  Procedure                               Abnormality         Status                     ---------                               -----------         ------                     COVID-19 and FLU A/B PCR...[[id number]]  Normal              Final result                 Please view results for these tests on the individual orders.    COVID-19 and FLU A/B PCR - Swab, Nasopharynx [[id number]]  (Normal) Collected: 09/12/23 1554    Lab Status: Final result Specimen: Swab from Nasopharynx Updated: 09/12/23 1631     COVID19 Not Detected     Influenza A PCR Not Detected     Influenza B PCR Not Detected    Narrative:      Fact sheet for providers: https://www.fda.gov/media/610348/download    Fact sheet for patients: https://www.fda.gov/media/006376/download    Test performed by PCR.            CT Abdomen Pelvis With Contrast    Result Date: 9/12/2023  CT ABDOMEN PELVIS W CONTRAST Date of Exam: 9/12/2023 4:37 PM EDT Indication: N/V/D, weight loss. Comparison: None available. Technique: Axial CT images were  obtained of the abdomen and pelvis following the uneventful intravenous administration of 85 mL Isovue-300. Reconstructed coronal and sagittal images were also obtained. Automated exposure control and iterative construction methods were used. Findings: Lower thorax: Lung bases are clear. Liver: No focal hepatic lesion on this single phase exam. Hepatic steatosis. Gallbladder and bile ducts: Gallbladder is unremarkable. No biliary ductal dilatation. Pancreas: No pancreatic duct dilation. No surrounding inflammation. Spleen: Spleen is mildly enlarged measuring 14 cm. Adrenal glands: No discrete adrenal nodule. Kidneys: No hydronephrosis. No suspicious renal lesions. Urinary bladder: Unremarkable. Reproductive organs: Prostate calcifications. Stomach and bowel: No evidence of bowel obstruction. Diverticulosis. Normal appendix. Lymph nodes: No pathologically enlarged lymph nodes. Vessels: No abdominal aortic aneurysm. Peritoneum and retroperitoneum: No free air or free fluid. Soft tissues: Unremarkable. Osseous structures: No suspicious osseous lesions.     Impression: Impression: Hepatic steatosis and mild splenomegaly. Otherwise, no evidence of acute abnormality in the abdomen or pelvis. Electronically Signed: Desmond Solis MD  9/12/2023 4:58 PM EDT  Workstation ID: [id number]    XR Chest 1 View    Result Date: 9/12/2023  XR CHEST 1 VW Date of Exam: 9/12/2023 9:02 PM EDT Indication: fever Comparison: None available. Findings: Cardiomediastinal silhouette is within normal limits. Mild right hemidiaphragm elevation. No focal consolidation or overt pulmonary edema. No pleural effusion or pneumothorax. Osseous structures are intact.     Impression: Impression: No evidence of acute cardiopulmonary disease. Electronically Signed: Desmond Solis MD  9/12/2023 9:14 PM EDT  Workstation ID: [id number]         Current medications:  Scheduled Meds:heparin (porcine), 5,000 Units, Subcutaneous, Q12H  insulin lispro, 2-7 Units,  Subcutaneous, 4x Daily AC & at Bedtime  piperacillin-tazobactam, 3.375 g, Intravenous, Q8H  senna-docusate sodium, 2 tablet, Oral, BID  sodium chloride, 10 mL, Intravenous, Q12H  vancomycin, 15 mg/kg, Intravenous, Q24H      Continuous Infusions:Pharmacy to dose vancomycin,   sodium chloride, 100 mL/hr, Last Rate: 100 mL/hr (09/13/23 0645)      PRN Meds:.  acetaminophen    senna-docusate sodium **AND** polyethylene glycol **AND** bisacodyl **AND** bisacodyl    Calcium Replacement - Follow Nurse / BPA Driven Protocol    dextrose    dextrose    glucagon (human recombinant)    Magnesium Standard Dose Replacement - Follow Nurse / BPA Driven Protocol    nitroglycerin    ondansetron **OR** ondansetron    Pharmacy to dose vancomycin    Phosphorus Replacement - Follow Nurse / BPA Driven Protocol    Potassium Replacement - Follow Nurse / BPA Driven Protocol    Sodium Chloride (PF)    sodium chloride    sodium chloride    Assessment & Plan   Assessment & Plan     Active Hospital Problems    Diagnosis  POA    **Fever [R50.9]  Yes    Acute UTI (urinary tract infection) [N39.0]  Yes    Hyponatremia [E87.1]  Yes    Lactic acidosis [E87.20]  Yes    Hyperglycemia [R73.9]  Yes    Elevated LFTs [R79.89]  Yes    Bandemia [D72.825]  Yes      Resolved Hospital Problems   No resolved problems to display.        Brief Hospital Course to date:  Peter Faye is a 68 y.o. male who is presenting with several weeks of fever, diarrhea, nausea/vomiting.    Fever of unknown origin  Sepsis  --on admission, had elevated procal, lactate, bandemia, fever.  -- Start on IV antibiotics, and IV fluids with only some improvement.  - UA seems indicative of a UTI but not enough to explain the severity of symptoms.  - GI PCR panel negative, C. difficile negative, blood cultures in process, COVID/flu negative  - CT abdomen pelvis shows hepatic steatosis and mild splenomegaly but otherwise no acute abnormality.  Abdominal ultrasound is pending  - Given  underlying suspicion for infection but without clear source we will ask ID to see.  He has had rare intermittent episodes of confusion per wife so he may warrant an LP if infectious disease thinks it is indicated    Intractable nausea/diarrhea  Elevated transaminases  --reportedly only able to tolerate popsicles and small amnt of food  --so far, GI infectious etiology neg  --abd US pending. Hepatic steatosis seen on CT imaging. Hep panel neg  --will ask GI to see if may need scope    DM2  -A1c is 8  -SSI for now    Hyponatremia  --improving w fluids    Expected Discharge Location and Transportation: home  Expected Discharge   Expected Discharge Date: 9/20/2023; Expected Discharge Time:      DVT prophylaxis:  Medical DVT prophylaxis orders are present.     AM-PAC 6 Clicks Score (PT): 18 (09/13/23 0800)    CODE STATUS:   Code Status and Medical Interventions:   Ordered at: 09/12/23 8651     Code Status (Patient has no pulse and is not breathing):    CPR (Attempt to Resuscitate)     Medical Interventions (Patient has pulse or is breathing):    Full Support       Yanet Reed MD  09/13/23

## 2023-09-14 ENCOUNTER — APPOINTMENT (OUTPATIENT)
Dept: ULTRASOUND IMAGING | Facility: HOSPITAL | Age: 68
End: 2023-09-14

## 2023-09-14 ENCOUNTER — APPOINTMENT (OUTPATIENT)
Dept: GENERAL RADIOLOGY | Facility: HOSPITAL | Age: 68
End: 2023-09-14

## 2023-09-14 LAB
ALBUMIN SERPL-MCNC: 2.6 G/DL (ref 3.5–5.2)
ALBUMIN/GLOB SERPL: 0.7 G/DL
ALP SERPL-CCNC: 63 U/L (ref 39–117)
ALT SERPL W P-5'-P-CCNC: 38 U/L (ref 1–41)
ANION GAP SERPL CALCULATED.3IONS-SCNC: 8 MMOL/L (ref 5–15)
ANION GAP SERPL CALCULATED.3IONS-SCNC: 9 MMOL/L (ref 5–15)
ANION GAP SERPL CALCULATED.3IONS-SCNC: 9 MMOL/L (ref 5–15)
AST SERPL-CCNC: 49 U/L (ref 1–40)
BACTERIA SPEC AEROBE CULT: NO GROWTH
BASOPHILS # BLD MANUAL: 0 10*3/MM3 (ref 0–0.2)
BASOPHILS NFR BLD MANUAL: 0 % (ref 0–1.5)
BILIRUB SERPL-MCNC: 0.9 MG/DL (ref 0–1.2)
BUN SERPL-MCNC: 11 MG/DL (ref 8–23)
BUN SERPL-MCNC: 12 MG/DL (ref 8–23)
BUN SERPL-MCNC: 13 MG/DL (ref 8–23)
BUN/CREAT SERPL: 11.9 (ref 7–25)
BUN/CREAT SERPL: 12.2 (ref 7–25)
BUN/CREAT SERPL: 13.5 (ref 7–25)
CALCIUM SPEC-SCNC: 7.8 MG/DL (ref 8.6–10.5)
CALCIUM SPEC-SCNC: 7.9 MG/DL (ref 8.6–10.5)
CALCIUM SPEC-SCNC: 7.9 MG/DL (ref 8.6–10.5)
CHLORIDE SERPL-SCNC: 101 MMOL/L (ref 98–107)
CO2 SERPL-SCNC: 23 MMOL/L (ref 22–29)
CO2 SERPL-SCNC: 24 MMOL/L (ref 22–29)
CO2 SERPL-SCNC: 24 MMOL/L (ref 22–29)
CREAT SERPL-MCNC: 0.9 MG/DL (ref 0.76–1.27)
CREAT SERPL-MCNC: 0.96 MG/DL (ref 0.76–1.27)
CREAT SERPL-MCNC: 1.01 MG/DL (ref 0.76–1.27)
CYTOLOGIST CVX/VAG CYTO: NORMAL
DEPRECATED RDW RBC AUTO: 44.3 FL (ref 37–54)
EGFRCR SERPLBLD CKD-EPI 2021: 81 ML/MIN/1.73
EGFRCR SERPLBLD CKD-EPI 2021: 86.1 ML/MIN/1.73
EGFRCR SERPLBLD CKD-EPI 2021: 93 ML/MIN/1.73
EOSINOPHIL # BLD MANUAL: 0 10*3/MM3 (ref 0–0.4)
EOSINOPHIL NFR BLD MANUAL: 0 % (ref 0.3–6.2)
ERYTHROCYTE [DISTWIDTH] IN BLOOD BY AUTOMATED COUNT: 13.7 % (ref 12.3–15.4)
GLOBULIN UR ELPH-MCNC: 3.9 GM/DL
GLUCOSE BLDC GLUCOMTR-MCNC: 107 MG/DL (ref 70–130)
GLUCOSE BLDC GLUCOMTR-MCNC: 153 MG/DL (ref 70–130)
GLUCOSE BLDC GLUCOMTR-MCNC: 183 MG/DL (ref 70–130)
GLUCOSE BLDC GLUCOMTR-MCNC: 90 MG/DL (ref 70–130)
GLUCOSE SERPL-MCNC: 111 MG/DL (ref 65–99)
GLUCOSE SERPL-MCNC: 120 MG/DL (ref 65–99)
GLUCOSE SERPL-MCNC: 137 MG/DL (ref 65–99)
HCT VFR BLD AUTO: 36 % (ref 37.5–51)
HGB BLD-MCNC: 12.2 G/DL (ref 13–17.7)
LYMPHOCYTES # BLD MANUAL: 2.85 10*3/MM3 (ref 0.7–3.1)
LYMPHOCYTES NFR BLD MANUAL: 10 % (ref 5–12)
MCH RBC QN AUTO: 30 PG (ref 26.6–33)
MCHC RBC AUTO-ENTMCNC: 33.9 G/DL (ref 31.5–35.7)
MCV RBC AUTO: 88.5 FL (ref 79–97)
MONOCYTES # BLD: 0.73 10*3/MM3 (ref 0.1–0.9)
NEUTROPHILS # BLD AUTO: 3.73 10*3/MM3 (ref 1.7–7)
NEUTROPHILS NFR BLD MANUAL: 40 % (ref 42.7–76)
NEUTS BAND NFR BLD MANUAL: 11 % (ref 0–5)
PATH INTERP BLD-IMP: NORMAL
PHOSPHATE SERPL-MCNC: 2.8 MG/DL (ref 2.5–4.5)
PLAT MORPH BLD: NORMAL
PLATELET # BLD AUTO: 119 10*3/MM3 (ref 140–450)
PMV BLD AUTO: 9.7 FL (ref 6–12)
POTASSIUM SERPL-SCNC: 3.7 MMOL/L (ref 3.5–5.2)
POTASSIUM SERPL-SCNC: 3.9 MMOL/L (ref 3.5–5.2)
POTASSIUM SERPL-SCNC: 4 MMOL/L (ref 3.5–5.2)
PROT SERPL-MCNC: 6.5 G/DL (ref 6–8.5)
RBC # BLD AUTO: 4.07 10*6/MM3 (ref 4.14–5.8)
RBC MORPH BLD: NORMAL
SMUDGE CELLS BLD QL SMEAR: ABNORMAL
SODIUM SERPL-SCNC: 133 MMOL/L (ref 136–145)
SODIUM SERPL-SCNC: 133 MMOL/L (ref 136–145)
SODIUM SERPL-SCNC: 134 MMOL/L (ref 136–145)
VARIANT LYMPHS NFR BLD MANUAL: 35 % (ref 19.6–45.3)
VARIANT LYMPHS NFR BLD MANUAL: 4 % (ref 0–5)
WBC NRBC COR # BLD: 7.31 10*3/MM3 (ref 3.4–10.8)

## 2023-09-14 PROCEDURE — 86618 LYME DISEASE ANTIBODY: CPT | Performed by: INTERNAL MEDICINE

## 2023-09-14 PROCEDURE — 86720 LEPTOSPIRA ANTIBODY: CPT | Performed by: INTERNAL MEDICINE

## 2023-09-14 PROCEDURE — 86638 Q FEVER ANTIBODY: CPT | Performed by: INTERNAL MEDICINE

## 2023-09-14 PROCEDURE — 25010000002 HEPARIN (PORCINE) PER 1000 UNITS: Performed by: STUDENT IN AN ORGANIZED HEALTH CARE EDUCATION/TRAINING PROGRAM

## 2023-09-14 PROCEDURE — 86738 MYCOPLASMA ANTIBODY: CPT | Performed by: INTERNAL MEDICINE

## 2023-09-14 PROCEDURE — 84100 ASSAY OF PHOSPHORUS: CPT | Performed by: STUDENT IN AN ORGANIZED HEALTH CARE EDUCATION/TRAINING PROGRAM

## 2023-09-14 PROCEDURE — 25010000002 CEFTRIAXONE PER 250 MG: Performed by: INTERNAL MEDICINE

## 2023-09-14 PROCEDURE — 80053 COMPREHEN METABOLIC PANEL: CPT | Performed by: STUDENT IN AN ORGANIZED HEALTH CARE EDUCATION/TRAINING PROGRAM

## 2023-09-14 PROCEDURE — 99232 SBSQ HOSP IP/OBS MODERATE 35: CPT | Performed by: HOSPITALIST

## 2023-09-14 PROCEDURE — 86038 ANTINUCLEAR ANTIBODIES: CPT | Performed by: PHYSICIAN ASSISTANT

## 2023-09-14 PROCEDURE — 71045 X-RAY EXAM CHEST 1 VIEW: CPT

## 2023-09-14 PROCEDURE — 85060 BLOOD SMEAR INTERPRETATION: CPT | Performed by: INTERNAL MEDICINE

## 2023-09-14 PROCEDURE — 76700 US EXAM ABDOM COMPLETE: CPT

## 2023-09-14 PROCEDURE — 25010000002 PIPERACILLIN SOD-TAZOBACTAM PER 1 G: Performed by: INTERNAL MEDICINE

## 2023-09-14 PROCEDURE — 86015 ACTIN ANTIBODY EACH: CPT | Performed by: PHYSICIAN ASSISTANT

## 2023-09-14 PROCEDURE — 97165 OT EVAL LOW COMPLEX 30 MIN: CPT

## 2023-09-14 PROCEDURE — 82948 REAGENT STRIP/BLOOD GLUCOSE: CPT

## 2023-09-14 PROCEDURE — 86757 RICKETTSIA ANTIBODY: CPT | Performed by: INTERNAL MEDICINE

## 2023-09-14 PROCEDURE — 86665 EPSTEIN-BARR CAPSID VCA: CPT | Performed by: INTERNAL MEDICINE

## 2023-09-14 PROCEDURE — 85007 BL SMEAR W/DIFF WBC COUNT: CPT | Performed by: INTERNAL MEDICINE

## 2023-09-14 PROCEDURE — 63710000001 INSULIN LISPRO (HUMAN) PER 5 UNITS: Performed by: INTERNAL MEDICINE

## 2023-09-14 PROCEDURE — 86381 MITOCHONDRIAL ANTIBODY EACH: CPT | Performed by: PHYSICIAN ASSISTANT

## 2023-09-14 PROCEDURE — 85025 COMPLETE CBC W/AUTO DIFF WBC: CPT | Performed by: STUDENT IN AN ORGANIZED HEALTH CARE EDUCATION/TRAINING PROGRAM

## 2023-09-14 PROCEDURE — 99232 SBSQ HOSP IP/OBS MODERATE 35: CPT | Performed by: PHYSICIAN ASSISTANT

## 2023-09-14 PROCEDURE — 86664 EPSTEIN-BARR NUCLEAR ANTIGEN: CPT | Performed by: INTERNAL MEDICINE

## 2023-09-14 RX ADMIN — INSULIN LISPRO 2 UNITS: 100 INJECTION, SOLUTION INTRAVENOUS; SUBCUTANEOUS at 20:55

## 2023-09-14 RX ADMIN — PIPERACILLIN SODIUM AND TAZOBACTAM SODIUM 3.38 G: 3; .375 INJECTION, SOLUTION INTRAVENOUS at 05:19

## 2023-09-14 RX ADMIN — HEPARIN SODIUM 5000 UNITS: 5000 INJECTION, SOLUTION INTRAVENOUS; SUBCUTANEOUS at 14:08

## 2023-09-14 RX ADMIN — SODIUM CHLORIDE 2000 MG: 900 INJECTION INTRAVENOUS at 17:40

## 2023-09-14 RX ADMIN — Medication 10 ML: at 14:09

## 2023-09-14 RX ADMIN — DOXYCYCLINE 100 MG: 100 CAPSULE ORAL at 20:50

## 2023-09-14 RX ADMIN — PIPERACILLIN SODIUM AND TAZOBACTAM SODIUM 3.38 G: 3; .375 INJECTION, SOLUTION INTRAVENOUS at 14:09

## 2023-09-14 RX ADMIN — INSULIN LISPRO 2 UNITS: 100 INJECTION, SOLUTION INTRAVENOUS; SUBCUTANEOUS at 17:40

## 2023-09-14 RX ADMIN — Medication 1 CAPSULE: at 09:41

## 2023-09-14 RX ADMIN — DOXYCYCLINE 100 MG: 100 CAPSULE ORAL at 09:41

## 2023-09-14 RX ADMIN — HEPARIN SODIUM 5000 UNITS: 5000 INJECTION, SOLUTION INTRAVENOUS; SUBCUTANEOUS at 20:50

## 2023-09-14 NOTE — PROGRESS NOTES
[facility] Progress Note    Admission Date: 9/12/2023    Peter Faye  1955  [id number]    Date: 9/14/2023    Antibiotics:  Anti-Infectives (From admission, onward)      Ordered     Dose/Rate Route Frequency Start Stop    09/14/23 1621  cefTRIAXone (ROCEPHIN) 2000 mg/100 mL 0.9% NS IVPB (MBP)        Ordering Provider: Eva Whiteside MD    2,000 mg  over 30 Minutes Intravenous Every 24 Hours 09/14/23 1715 09/21/23 1714 09/13/23 1558  doxycycline (MONODOX) capsule 100 mg        Ordering Provider: Eva Whiteside MD    100 mg Oral Every 12 Hours Scheduled 09/13/23 2100 09/23/23 2059 09/12/23 2159  vancomycin 1750 mg/500 mL 0.9% NS IVPB ([facility])        Ordering Provider: Sheri Juares, DO    20 mg/kg × 93 kg  over 105 Minutes Intravenous Once 09/12/23 2215 09/13/23 0023    09/12/23 2145  piperacillin-tazobactam (ZOSYN) 3.375 g in iso-osmotic dextrose 50 ml (premix)        Ordering Provider: Sheri Juares, DO    3.375 g  over 30 Minutes Intravenous Once 09/12/23 2201 09/12/23 2308    09/12/23 2145  Pharmacy to dose vancomycin        Ordering Provider: Sheri Juares, DO     Does not apply Continuous PRN 09/12/23 2144 09/17/23 2143          Reason for Consultation: fuo     History of present illness:    Patient is a 68 y.o. male with history of nephrolithiasis who presented to the ED 9/12/23 with a 1 month history of intermittent fever (as high as 102), diarrhea, nausea and vomiting. Over the course of 1 month he lost 8 lbs. He did not notice a rash or joint pain. On arrival at the ED WBC 10 with L shift and 3% atypical lymphocytes, lactic acid 3.2, PCT 0.73, lipase normal and Na 126  Platelets 155-> 143 and transaminases were mildly elevated. He was started on Zosyn and vancomycin. Tm 101.3  His nausea and vomiting are much improved and he was able to eat ~ 50% of lunch consisting of chicken and rice. He has continued to have diarrhea. GI panel pcr and cdiff are negative. CT abd significant only for  "hepatic steatosis and mild splenomegaly  UA with mild pyuria. 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 He removed several ticks earlier this year but not recently No pets at home. He has never lived or worked on a farm. No travel in the last 20 years. 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    9/14/23 Tm 101 last pm No temps recorded since 8 am but he reports that he has been afebrile today  He continues to improve and is tolerating solid food  Platelets down to 119 and 10% bands and smudge cells reported on manual diff          Review of Systems:  Constitutional-- + Fever, chills & sweats.  Appetite poor although now improved, + malaise and fatigue.  HEENT-- No new vision, hearing or throat complaints.  No epistaxis or oral sores.  Denies odynophagia or dysphagia. No headache, photophobia or neck stiffness.  CV-- No chest pain, palpitation or syncope  Resp-- No SOB or hemoptysis  Occasional mild nonproductive cough  GI- + nausea &vomiting which are much improved. Ongoing diarrhea which may be improving  No hematochezia, melena, or hematemesis. Denies jaundice or chronic liver disease.  -- No dysuria, hematuria, or flank pain.  Denies hesitancy, urgency or flank pain.  Lymph- no swollen lymph nodes in neck/axilla or groin.   Heme- No active bruising or bleeding; no Hx of DVT or PE.  MS-- no swelling or pain in the bones or joints of arms/legs.  No new back pain.  Neuro-- No acute focal weakness or numbness in the arms or legs.  No seizures.  Skin--No rashes or lesions        Medical History[]Expand by Default        Past Medical History:   Diagnosis Date    Nephrolithiasis        SURGICAL HISTORY  Procedure for ureteral stone ~ 10 years " ago  ORIF leg fracture > 10 years ago       PE:  Vital Signs  Temp  Min: 98.5 °F (36.9 °C)  Max: 101.1 °F (38.4 °C)  BP  Min: 92/60  Max: 132/78  Pulse  Min: 79  Max: 97  Resp  Min: 18  Max: 18  SpO2  Min: 94 %  Max: 98 %  GENERAL: Awake and alert, in no acute distress.   HEENT: Normocephalic, atraumatic.  PERRL. EOMI. No conjunctival injection. No icterus. Oropharynx clear   NECK: Supple without nuchal rigidity. No mass.  LYMPH: No cervical lymphadenopathy.  HEART: RRR; No murmur, rubs, gallops.   LUNGS: Clear to auscultation bilaterally without wheezing, rales, rhonchi. Normal respiratory effort. Nonlabored. No dullness.  ABDOMEN: Soft, nontender, nondistended. Positive bowel sounds. No rebound or guarding. NO mass or HSM.  EXT:  No cyanosis, clubbing or edema. No cord.  :  Without Roper catheter.  MSK: No joint effusions or erythema  SKIN: Warm and dry without cutaneous eruptions on Inspection/palpation.    NEURO: Oriented to PPT.  Motor 5/5 strength  PSYCHIATRIC: Normal insight and judgment. Cooperative with PE     Laboratory Data    Results from last 7 days   Lab Units 09/14/23  0304 09/13/23  0416 09/12/23  1554   WBC 10*3/mm3 7.31 8.55 10.25   HEMOGLOBIN g/dL 12.2* 12.5* 14.9   HEMATOCRIT % 36.0* 36.4* 44.7   PLATELETS 10*3/mm3 119* 143 155     Results from last 7 days   Lab Units 09/14/23  1146   SODIUM mmol/L 134*   POTASSIUM mmol/L 3.7   CHLORIDE mmol/L 101   CO2 mmol/L 24.0   BUN mg/dL 11   CREATININE mg/dL 0.90   GLUCOSE mg/dL 111*   CALCIUM mg/dL 7.9*     Results from last 7 days   Lab Units 09/14/23  0304   ALK PHOS U/L 63   BILIRUBIN mg/dL 0.9   ALT (SGPT) U/L 38   AST (SGOT) U/L 49*         Results from last 7 days   Lab Units 09/13/23  1159   CRP mg/dL 10.78*       Estimated Creatinine Clearance: 91.6 mL/min (by C-G formula based on SCr of 0.9 mg/dL).      Microbiology:  negative    Radiology:  Imaging Results (Last 72 Hours)       Procedure Component Value Units Date/Time    US Abdomen Complete  [[id number]] Collected: 09/14/23 0802     Updated: 09/14/23 0809    Narrative:      US ABDOMEN COMPLETE    Date of Exam: 9/14/2023 7:22 AM EDT    Indication: fever unknown origin, vomiting and diarrhea.    Comparison: No comparisons available.    Technique: Routine gray scale and color Doppler imaging of the complete abdomen was performed according to routine protocol.      Findings:  The abdominal aorta is normal in size. The intrahepatic IVC is not well seen. The pancreas is normal in size and echogenicity. There is fatty infiltration of the liver. There is poor penetration of the sound beam. There are no focal liver lesions or bile   duct dilatation. There is no gallbladder dilatation, wall thickening, or cholelithiasis. There is a 2 mm polyp along the gallbladder wall. There is no pericholecystic fluid or edema. There are no renal lesions or hydronephrosis. The spleen is at the   upper limits of normal in size.      Impression:      Impression:  Borderline splenomegaly. Fatty liver.        Electronically Signed: Joy Salgado MD    9/14/2023 8:06 AM EDT    Workstation ID: [id number]    XR Chest 1 View [[id number]] Collected: 09/14/23 0714     Updated: 09/14/23 0718    Narrative:      XR CHEST 1 VW    Date of Exam: 9/14/2023 3:35 AM EDT    Indication: pneumonia    Comparison: 9/12/2023    Findings:  The heart size is normal. The pulmonary vascular pattern is normal. There is mild right basilar atelectasis. The lungs are otherwise clear.      Impression:      Impression:  Mild right basilar discoid atelectasis otherwise negative chest      Electronically Signed: Ruben Schultz MD    9/14/2023 7:15 AM EDT    Workstation ID: [id number]    XR Chest 1 View [[id number]] Collected: 09/12/23 2113     Updated: 09/12/23 2117    Narrative:      XR CHEST 1 VW    Date of Exam: 9/12/2023 9:02 PM EDT    Indication: fever    Comparison: None available.    Findings:  Cardiomediastinal silhouette is within normal limits. Mild right  hemidiaphragm elevation. No focal consolidation or overt pulmonary edema. No pleural effusion or pneumothorax. Osseous structures are intact.      Impression:      Impression:  No evidence of acute cardiopulmonary disease.    Electronically Signed: Desmond Solis MD    9/12/2023 9:14 PM EDT    Workstation ID: [id number]    CT Abdomen Pelvis With Contrast [[id number]] Collected: 09/12/23 1648     Updated: 09/12/23 1701    Narrative:      CT ABDOMEN PELVIS W CONTRAST    Date of Exam: 9/12/2023 4:37 PM EDT    Indication: N/V/D, weight loss.    Comparison: None available.    Technique: Axial CT images were obtained of the abdomen and pelvis following the uneventful intravenous administration of 85 mL Isovue-300. Reconstructed coronal and sagittal images were also obtained. Automated exposure control and iterative   construction methods were used.    Findings:    Lower thorax: Lung bases are clear.    Liver: No focal hepatic lesion on this single phase exam. Hepatic steatosis.    Gallbladder and bile ducts: Gallbladder is unremarkable. No biliary ductal dilatation.    Pancreas: No pancreatic duct dilation. No surrounding inflammation.    Spleen: Spleen is mildly enlarged measuring 14 cm.    Adrenal glands: No discrete adrenal nodule.    Kidneys: No hydronephrosis. No suspicious renal lesions.    Urinary bladder: Unremarkable.    Reproductive organs: Prostate calcifications.    Stomach and bowel: No evidence of bowel obstruction. Diverticulosis. Normal appendix.    Lymph nodes: No pathologically enlarged lymph nodes.    Vessels: No abdominal aortic aneurysm.    Peritoneum and retroperitoneum: No free air or free fluid.    Soft tissues: Unremarkable.    Osseous structures: No suspicious osseous lesions.      Impression:      Impression:    Hepatic steatosis and mild splenomegaly.    Otherwise, no evidence of acute abnormality in the abdomen or pelvis.    Electronically Signed: Desmond Solis MD    9/12/2023 4:58 PM EDT     Workstation ID: [id number]            I personally reviewed the radiographic studies          Impression:         -- FUO of ~ 1 month duration in a patient with extensive outdoor exposure.   The absence of recent recognized tick bites does not r/o rickettsial or borrelia infections. If his illness has a single cause then RMSF or ehrlichia are less likely since these infections often result in a fulminant illness. Q fever or Lyme could have a more prolonged course although his symptoms do not fit the classic presentation.  His platelets are at the lower range of normal and are falling over 24 hours  Viral illness such as EBV, CMV etc are possible and could possibly account for his symptoms     -- Nausea and vomiting- improved Lipase normal     -- Diarrhea- persists  GI panel and c diff negative     -- Thrombocytopenia   Could be drug related (zosyn) or secondary to infection    -- Persistent L shift with smudge cells on diff  Likely reactive but some concern re: leukemia     -- History of nephrolithiasis and prior urologic procedure  No hydronephrosis on 9/12 CT   Mild pyuria of unclear significance      PLAN/RECOMMENDATIONS:   Thank you for asking us to see Peter Faye, I recommend the following:     -- stop vanc since blood cultures remain negative     -- change zosyn to ceftriaxone since the former could potentially be exacerbating thrombocytopenia     -- empiric doxycycline     -- peripheral smear with path review for babesiosis and leukemia      -- serologies for zoonoses     -- serologies for viral pathogens      -- await blood and urine culture results    If he continues to improve consider discharge 9/15 on cefuroxime and doxycycline x 7 days    F/u with me ~ 10 days     Eva Whiteside MD  9/14/2023

## 2023-09-14 NOTE — THERAPY EVALUATION
Patient Name: Peter Faye  : 1955    MRN: [id number]                              Today's Date: 2023       Admit Date: 2023    Visit Dx:     ICD-10-CM ICD-9-CM   1. Nausea vomiting and diarrhea  R11.2 787.91    R19.7 787.01   2. Bandemia  D72.825 288.66   3. Hyponatremia  E87.1 276.1   4. Fever, unspecified fever cause  R50.9 780.60   5. Hyperglycemia  R73.9 790.29     Patient Active Problem List   Diagnosis    Fever    Acute UTI (urinary tract infection)    Hyponatremia    Lactic acidosis    Hyperglycemia    Elevated LFTs    Bandemia     Past Medical History:   Diagnosis Date    Nephrolithiasis      History reviewed. No pertinent surgical history.   General Information       Row Name 23 [facility]          OT Time and Intention    Document Type evaluation  -     Mode of Treatment occupational therapy  -       Row Name 23 [facility]          General Information    Patient Profile Reviewed yes  -     Prior Level of Function independent:;all household mobility;transfer;ADL's  -     Existing Precautions/Restrictions fall  -     Barriers to Rehab none identified  -       Row Name 2337          Living Environment    People in Home spouse  -       Row Name 23          Home Main Entrance    Number of Stairs, Main Entrance one  -       Row Name 2337          Stairs Within Home, Primary    Number of Stairs, Within Home, Primary none  -       Row Name 23          Cognition    Orientation Status (Cognition) oriented x 4  -       Row Name 2337          Safety Issues, Functional Mobility    Safety Issues Affecting Function (Mobility) safety precaution awareness;insight into deficits/self-awareness  -     Impairments Affecting Function (Mobility) balance;endurance/activity tolerance  -               User Key  (r) = Recorded By, (t) = Taken By, (c) = Cosigned By      Initials Name Provider Type     Joan Amaya, JORGE Occupational Therapist                      Mobility/ADL's       Row Name 09/14/23 0938          Bed Mobility    Bed Mobility scooting/bridging;supine-sit  -     Scooting/Bridging [place] (Bed Mobility) supervision  -     Supine-Sit [place] (Bed Mobility) supervision  -     Assistive Device (Bed Mobility) bed rails;head of bed elevated  -     Comment, (Bed Mobility) Demonstrates good safety and sequencing.  -       Row Name 09/14/23 0938          Transfers    Transfers sit-stand transfer;bed-chair transfer  -       Row Name 09/14/23 0938          Bed-Chair Transfer    Bed-Chair [place] (Transfers) standby assist  -       Row Name 09/14/23 0938          Sit-Stand Transfer    Sit-Stand [place] (Transfers) standby assist  -[facility] Name 09/14/23 0938          Activities of Daily Living    BADL Assessment/Intervention lower body dressing;grooming;upper body dressing  -[facility] Name 09/14/23 0938          Lower Body Dressing Assessment/Training    [place] Level (Lower Body Dressing) don;socks;set up  -     Position (Lower Body Dressing) unsupported sitting  -[facility] Name 09/14/23 0938          Grooming Assessment/Training    [place] Level (Grooming) wash face, hands;set up  -LC     Position (Grooming) unsupported sitting  -[facility] Name 09/14/23 0938          Upper Body Dressing Assessment/Training    [place] Level (Upper Body Dressing) don;front opening garment;set up  -LC     Position (Upper Body Dressing) supine  -               User Key  (r) = Recorded By, (t) = Taken By, (c) = Cosigned By      Initials Name Provider Type     Joan Amaya OT Occupational Therapist                   Obj/Interventions       Row Name 09/14/23 0939          Sensory Assessment (Somatosensory)    Sensory Assessment (Somatosensory) UE sensation intact  -[facility] Name 09/14/23 0939          Vision Assessment/Intervention    Visual Impairment/Limitations corrective lenses full-time  -        Row Name 09/14/23 0939          Range of Motion Comprehensive    General Range of Motion bilateral upper extremity ROM WNL  -[facility] Name 09/14/23 0939          Strength Comprehensive (MMT)    General Manual Muscle Testing (MMT) Assessment no strength deficits identified  -[facility] Name 09/14/23 0939          Motor Skills    Motor Skills functional endurance  -     Functional Endurance decreased activity tolerance  -[facility] Name 09/14/23 0939          Balance    Balance Assessment sitting static balance;sitting dynamic balance;standing static balance;standing dynamic balance  -     Static Sitting Balance supervision  -     Dynamic Sitting Balance standby assist  -     Position, Sitting Balance unsupported;sitting edge of bed  -     Static Standing Balance standby assist  -     Dynamic Standing Balance standby assist  -     Position/Device Used, Standing Balance unsupported  -     Balance Interventions sitting;standing;sit to stand;occupation based/functional task;weight shifting activity  -     Comment, Balance NO LOB, SBA for safety  -               User Key  (r) = Recorded By, (t) = Taken By, (c) = Cosigned By      Initials Name Provider Type     Joan Amaya OT Occupational Therapist                   Goals/Plan       [facility] Name 09/14/23 0950          Transfer Goal 1 (OT)    Activity/Assistive Device (Transfer Goal 1, OT) sit-to-stand/stand-to-sit;bed-to-chair/chair-to-bed  -     [place] Level/Cues Needed (Transfer Goal 1, OT) supervision required  -     Time Frame (Transfer Goal 1, OT) long term goal (LTG);by discharge  -     Progress/Outcome (Transfer Goal 1, OT) goal ongoing  -LC       Row Name 09/14/23 0950          Dressing Goal 1 (OT)    Activity/Device (Dressing Goal 1, OT) lower body dressing  -     [place]/Cues Needed (Dressing Goal 1, OT) modified independence  -     Time Frame (Dressing Goal 1, OT) long term goal (LTG);by discharge  -      Progress/Outcome (Dressing Goal 1, OT) goal ongoing  -       Row Name 09/14/23 0950          Toileting Goal 1 (OT)    Activity/Device (Toileting Goal 1, OT) adjust/manage clothing;perform perineal hygiene  -     [place] Level/Cues Needed (Toileting Goal 1, OT) modified independence  -     Time Frame (Toileting Goal 1, OT) long term goal (LTG);by discharge  -     Progress/Outcome (Toileting Goal 1, OT) goal ongoing  -               User Key  (r) = Recorded By, (t) = Taken By, (c) = Cosigned By      Initials Name Provider Type     Joan Amaya, OT Occupational Therapist                   Clinical Impression       Row Name 09/14/23 0940          Pain Assessment    Pretreatment Pain Rating 0/10 - no pain  -     Posttreatment Pain Rating 0/10 - no pain  -     Additional Documentation Pain Scale: Word Pre/Post-Treatment (Group)  -       Row Name 09/14/23 0940          Plan of Care Review    Plan of Care Reviewed With patient;spouse  -     Progress improving  -     Outcome Evaluation Pt. presents below baseline with ADLs and functional mobility. Limited by nausea and decreased activity tolerance. Skilled OT services warranted to promote return to PLOF. Recommend home with assist at discharge.  -       Row Name 09/14/23 0940          Therapy Assessment/Plan (OT)    Patient/Family Therapy Goal Statement (OT) To return to PLOF  -     Therapy Frequency (OT) daily  -       Row Name 09/14/23 0940          Therapy Plan Review/Discharge Plan (OT)    Anticipated Discharge Disposition (OT) home with assist  -       Row Name 09/14/23 0940          Vital Signs    Pre Systolic BP Rehab 115  -     Pre Treatment Diastolic BP 65  -     Posttreatment Heart Rate (beats/min) 86  -LC     Post SpO2 (%) 96  -     O2 Delivery Post Treatment room air  -     Pre Patient Position Supine  -     Post Patient Position Sitting  -       Row Name 09/14/23 0940          Positioning and Restraints     Pre-Treatment Position in bed  -LC     Post Treatment Position chair  -LC     In Chair notified nsg;reclined;call light within reach;encouraged to call for assist;exit alarm on;with family/caregiver;legs elevated  -               User Key  (r) = Recorded By, (t) = Taken By, (c) = Cosigned By      Initials Name Provider Type    Joan Ash OT Occupational Therapist                   Outcome Measures       Row Name 09/14/23 0952          How much help from another is currently needed...    Putting on and taking off regular lower body clothing? 3  -LC     Bathing (including washing, rinsing, and drying) 3  -LC     Toileting (which includes using toilet bed pan or urinal) 3  -LC     Putting on and taking off regular upper body clothing 4  -LC     Taking care of personal grooming (such as brushing teeth) 4  -LC     Eating meals 4  -     AM-PAC 6 Clicks Score (OT) 21  -       Row Name 09/14/23 0952          Functional Assessment    Outcome Measure Options AM-PAC 6 Clicks Daily Activity (OT)  -               User Key  (r) = Recorded By, (t) = Taken By, (c) = Cosigned By      Initials Name Provider Type    Joan Ash OT Occupational Therapist                    Occupational Therapy Education       Title: PT OT SLP Therapies (In Progress)       Topic: Occupational Therapy (In Progress)       Point: ADL training (Done)       Description:   Instruct learner(s) on proper safety adaptation and remediation techniques during self care or transfers.   Instruct in proper use of assistive devices.                  Learning Progress Summary             Patient Acceptance, E, VU,NR by  at 9/14/2023 0812                         Point: Home exercise program (Not Started)       Description:   Instruct learner(s) on appropriate technique for monitoring, assisting and/or progressing therapeutic exercises/activities.                  Learner Progress:  Not documented in this visit.              Point: Precautions  (Done)       Description:   Instruct learner(s) on prescribed precautions during self-care and functional transfers.                  Learning Progress Summary             Patient Acceptance, E, VU,NR by  at 9/14/2023 0812                         Point: Body mechanics (Done)       Description:   Instruct learner(s) on proper positioning and spine alignment during self-care, functional mobility activities and/or exercises.                  Learning Progress Summary             Patient Acceptance, E, VU,NR by  at 9/14/2023 0812                                         User Key       Initials Effective Dates Name Provider Type [facility] 06/16/21 -  Joan Amaya, JORGE Occupational Therapist OT                  OT Recommendation and Plan  Therapy Frequency (OT): daily  Plan of Care Review  Plan of Care Reviewed With: patient, spouse  Progress: improving  Outcome Evaluation: Pt. presents below baseline with ADLs and functional mobility. Limited by nausea and decreased activity tolerance. Skilled OT services warranted to promote return to PLOF. Recommend home with assist at discharge.     Time Calculation:   Evaluation Complexity (OT)  Review Occupational Profile/Medical/Therapy History Complexity: brief/low complexity  Assessment, Occupational Performance/Identification of Deficit Complexity: 1-3 performance deficits  Clinical Decision Making Complexity (OT): problem focused assessment/low complexity  Overall Complexity of Evaluation (OT): low complexity     Time Calculation- OT       Row Name 09/14/23 1001             Time Calculation- OT    OT Start Time 0812  -      OT Received On 09/14/23  -      OT Goal Re-Cert Due Date 09/24/23  -         Untimed Charges    OT Eval/Re-eval Minutes 49  -LC         Total Minutes    Untimed Charges Total Minutes 49  -LC       Total Minutes 49  -LC                User Key  (r) = Recorded By, (t) = Taken By, (c) = Cosigned By      Initials Name Provider Type     Jose Luis  JORGE Franco Occupational Therapist                  Therapy Charges for Today       Code Description Service Date Service Provider Modifiers Qty    [id number] HC OT EVAL LOW COMPLEXITY 4 9/14/2023 Joan Amaya OT GO 1                 Joan Amaya OT  9/14/2023

## 2023-09-14 NOTE — PLAN OF CARE
Problem: Adult Inpatient Plan of Care  Goal: Plan of Care Review  Outcome: Ongoing, Progressing  Goal: Patient-Specific Goal (Individualized)  Outcome: Ongoing, Progressing  Goal: Absence of Hospital-Acquired Illness or Injury  Outcome: Ongoing, Progressing  Intervention: Identify and Manage Fall Risk  Recent Flowsheet Documentation  Taken 9/14/2023 1400 by Abhilash Jimenez RN  Safety Promotion/Fall Prevention:   activity supervised   assistive device/personal items within reach   clutter free environment maintained   fall prevention program maintained   lighting adjusted   nonskid shoes/slippers when out of bed   room organization consistent   safety round/check completed  Taken 9/14/2023 1200 by Abhilash Jimenez RN  Safety Promotion/Fall Prevention:   activity supervised   assistive device/personal items within reach   clutter free environment maintained   fall prevention program maintained   lighting adjusted   nonskid shoes/slippers when out of bed   room organization consistent   safety round/check completed  Taken 9/14/2023 1000 by Abhilash Jimenez RN  Safety Promotion/Fall Prevention:   activity supervised   assistive device/personal items within reach   clutter free environment maintained   fall prevention program maintained   lighting adjusted   nonskid shoes/slippers when out of bed   room organization consistent   safety round/check completed  Taken 9/14/2023 0800 by Abhilash Jimenez RN  Safety Promotion/Fall Prevention:   activity supervised   assistive device/personal items within reach   clutter free environment maintained   fall prevention program maintained   lighting adjusted   nonskid shoes/slippers when out of bed   room organization consistent   safety round/check completed  Intervention: Prevent Skin Injury  Recent Flowsheet Documentation  Taken 9/14/2023 1400 by Abhilash Jimenez RN  Body Position: position changed independently  Skin Protection:   adhesive use limited   incontinence  pads utilized   transparent dressing maintained   tubing/devices free from skin contact  Taken 9/14/2023 1200 by Abhilash Jimenez RN  Body Position: position changed independently  Skin Protection:   adhesive use limited   incontinence pads utilized   transparent dressing maintained   tubing/devices free from skin contact  Taken 9/14/2023 1000 by Abhilash Jimenez RN  Body Position: position changed independently  Skin Protection:   adhesive use limited   incontinence pads utilized   transparent dressing maintained   tubing/devices free from skin contact  Taken 9/14/2023 0800 by Abhilash Jimenez RN  Body Position: position changed independently  Intervention: Prevent and Manage VTE (Venous Thromboembolism) Risk  Recent Flowsheet Documentation  Taken 9/14/2023 1400 by Abhilash Jimenez RN  Activity Management:   up in chair   activity encouraged  Taken 9/14/2023 1200 by Abhilash Jimenez RN  Activity Management:   up in chair   activity encouraged  Taken 9/14/2023 1000 by Abhilash Jimenez RN  Activity Management: activity encouraged  Taken 9/14/2023 0800 by Abhilash Jimenez RN  Activity Management: activity encouraged  VTE Prevention/Management: (See MAR) other (see comments)  Range of Motion: active ROM (range of motion) encouraged  Intervention: Prevent Infection  Recent Flowsheet Documentation  Taken 9/14/2023 1400 by Abhilash Jimenez RN  Infection Prevention:   environmental surveillance performed   hand hygiene promoted   rest/sleep promoted  Taken 9/14/2023 1200 by Abhilash Jimenez RN  Infection Prevention:   environmental surveillance performed   hand hygiene promoted   rest/sleep promoted  Taken 9/14/2023 1000 by Abhilash Jimenez RN  Infection Prevention:   environmental surveillance performed   hand hygiene promoted   rest/sleep promoted  Taken 9/14/2023 0800 by Abhilash Jimenez RN  Infection Prevention:   environmental surveillance performed   hand hygiene promoted   rest/sleep  promoted  Goal: Optimal Comfort and Wellbeing  Outcome: Ongoing, Progressing  Intervention: Provide Person-Centered Care  Recent Flowsheet Documentation  Taken 9/14/2023 1400 by Abhilash Jimenez RN  Trust Relationship/Rapport:   care explained   choices provided  Taken 9/14/2023 1200 by Abhilash Jimenez RN  Trust Relationship/Rapport:   care explained   choices provided  Taken 9/14/2023 1000 by Abhilash Jimenez RN  Trust Relationship/Rapport:   care explained   choices provided  Taken 9/14/2023 0800 by Abhilash Jimenez RN  Trust Relationship/Rapport:   care explained   choices provided  Goal: Readiness for Transition of Care  Outcome: Ongoing, Progressing     Problem: Adjustment to Illness (Sepsis/Septic Shock)  Goal: Optimal Coping  Outcome: Ongoing, Progressing  Intervention: Optimize Psychosocial Adjustment to Illness  Recent Flowsheet Documentation  Taken 9/14/2023 1400 by Abhilash Jimenez RN  Family/Support System Care:   self-care encouraged   support provided  Taken 9/14/2023 1200 by Abhilash Jimenez RN  Family/Support System Care:   self-care encouraged   support provided  Taken 9/14/2023 1000 by Abhilash Jimenez RN  Family/Support System Care:   self-care encouraged   support provided     Problem: Bleeding (Sepsis/Septic Shock)  Goal: Absence of Bleeding  Outcome: Ongoing, Progressing     Problem: Glycemic Control Impaired (Sepsis/Septic Shock)  Goal: Blood Glucose Level Within Desired Range  Outcome: Ongoing, Progressing     Problem: Infection Progression (Sepsis/Septic Shock)  Goal: Absence of Infection Signs and Symptoms  Outcome: Ongoing, Progressing  Intervention: Initiate Sepsis Management  Recent Flowsheet Documentation  Taken 9/14/2023 1400 by Abhilash Jimenez RN  Infection Prevention:   environmental surveillance performed   hand hygiene promoted   rest/sleep promoted  Taken 9/14/2023 1200 by Abhilash Jimenez RN  Infection Prevention:   environmental surveillance performed   hand  hygiene promoted   rest/sleep promoted  Taken 9/14/2023 1000 by Abhilash Jimenez, RN  Infection Prevention:   environmental surveillance performed   hand hygiene promoted   rest/sleep promoted  Taken 9/14/2023 0800 by Abhilash Jimenez, RN  Infection Prevention:   environmental surveillance performed   hand hygiene promoted   rest/sleep promoted  Intervention: Promote Recovery  Recent Flowsheet Documentation  Taken 9/14/2023 1400 by Abhilash Jimenez, RN  Activity Management:   up in chair   activity encouraged  Taken 9/14/2023 1200 by Abhilash Jimenez, RN  Activity Management:   up in chair   activity encouraged  Taken 9/14/2023 1000 by Abhilash Jimenez, RN  Activity Management: activity encouraged  Taken 9/14/2023 0800 by Abhilash Jimenez, RN  Activity Management: activity encouraged     Problem: Nutrition Impaired (Sepsis/Septic Shock)  Goal: Optimal Nutrition Intake  Outcome: Ongoing, Progressing   Goal Outcome Evaluation:   Plan of care reviewed with: Patient

## 2023-09-14 NOTE — PROGRESS NOTES
[facility]  PROGRESS NOTE    Patient Name: Peter Faye  : 1955  MRN: [id number]    Date of Admission: 2023  Primary Care Physician: Provider, No Known    Subjective   Subjective     CC:  F/U FUO    HPI:  Patient seen this morning. Improvement in GI symptoms. Actually feels hungry for the first time in awhile and is waiting for food after being NPO for his ultrasound this morning. Diarrhea has slowed down significantly.       Objective   Objective     Vital Signs:   Temp:  [98.5 °F (36.9 °C)-101.1 °F (38.4 °C)] 98.8 °F (37.1 °C)  Heart Rate:  [79-97] 86  Resp:  [18] 18  BP: ()/(60-78) 115/65     Physical Exam:  Gen-no acute distress  HENT-NCAT, mucous membranes moist  CV-RRR, S1 S2 normal, no m/r/g  Resp-CTAB, no wheezes or rales  Abd-soft, NT, ND, +BS  Ext-no edema  Neuro-A&Ox3, no focal deficits  Skin-no rashes  Psych-appropriate mood      Results Reviewed:  LAB RESULTS:      Lab 23  0304 23  1159 23  0416 23  2355 23  2158 23  1554   WBC 7.31  --  8.55  --   --  10.25   HEMOGLOBIN 12.2*  --  12.5*  --   --  14.9   HEMATOCRIT 36.0*  --  36.4*  --   --  44.7   PLATELETS 119*  --  143  --   --  155   NEUTROS ABS 3.73  --  5.47  --   --  7.18*   IMMATURE GRANS (ABS)  --   --  0.04  --   --   --    LYMPHS ABS  --   --  2.25  --   --   --    MONOS ABS  --   --  0.77  --   --   --    EOS ABS 0.00  --  0.00  --   --  0.00   MCV 88.5  --  87.3  --   --  89.2   CRP  --  10.78*  --   --   --   --    PROCALCITONIN  --   --   --   --   --  0.73*   LACTATE  --   --  2.2* 1.8 1.7 3.2*         Lab 23  1146 23  0304 23  2328 23  1759 23  11523  0416 23  2355   SODIUM 134* 133* 133* 133* 133* 132*  --    POTASSIUM 3.7 4.0 3.9 3.8 3.7 4.2  --    CHLORIDE 101 101 101 100 99 97*  --    CO2 24.0 24.0 23.0 22.0 24.0 24.0  --    ANION GAP 9.0 8.0 9.0 11.0 10.0 11.0  --    BUN 11 12 13 14 15 15  --     CREATININE 0.90 1.01 0.96 1.05 1.17 1.17  --    EGFR 93.0 81.0 86.1 77.3 67.9 67.9  --    GLUCOSE 111* 120* 137* 187* 169* 179*  --    CALCIUM 7.9* 7.9* 7.8* 7.9* 8.0* 8.2*  --    MAGNESIUM  --   --   --   --   --  2.1  --    PHOSPHORUS  --  2.8  --  2.2*  --  2.2*  --    HEMOGLOBIN A1C  --   --   --   --   --   --  8.00*   TSH  --   --   --   --   --  0.891  --          Lab 09/14/23  0304 09/13/23  0416 09/12/23  1554   TOTAL PROTEIN 6.5 7.3 8.7*   ALBUMIN 2.6* 3.0* 3.5   GLOBULIN 3.9 4.3 5.2   ALT (SGPT) 38 46* 56*   AST (SGOT) 49* 51* 58*   BILIRUBIN 0.9 1.1 1.3*   ALK PHOS 63 72 88   LIPASE  --   --  32                     Brief Urine Lab Results  (Last result in the past 365 days)        Color   Clarity   Blood   Leuk Est   Nitrite   Protein   CREAT   Urine HCG        09/12/23 1554 Dark Yellow   Clear   Negative   Trace   Negative   100 mg/dL (2+)                   Microbiology Results Abnormal       Procedure Component Value - Date/Time    Urine Culture - Urine, Urine, Clean Catch [[id number]]  (Normal) Collected: 09/12/23 1554    Lab Status: Final result Specimen: Urine, Clean Catch Updated: 09/14/23 0801     Urine Culture No growth    Blood Culture - Blood, Arm, Right [[id number]]  (Normal) Collected: 09/12/23 2200    Lab Status: Preliminary result Specimen: Blood from Arm, Right Updated: 09/13/23 2231     Blood Culture No growth at 24 hours    Blood Culture - Blood, Arm, Right [[id number]]  (Normal) Collected: 09/12/23 2200    Lab Status: Preliminary result Specimen: Blood from Arm, Right Updated: 09/13/23 2231     Blood Culture No growth at 24 hours    Gastrointestinal Panel, PCR - Stool, Per Rectum [[id number]]  (Normal) Collected: 09/13/23 0920    Lab Status: Final result Specimen: Stool from Per Rectum Updated: 09/13/23 1110     Campylobacter Not Detected     Plesiomonas shigelloides Not Detected     Salmonella Not Detected     Vibrio Not Detected     Vibrio cholerae Not Detected     Yersinia  enterocolitica Not Detected     Enteroaggregative E. coli (EAEC) Not Detected     Enteropathogenic E. coli (EPEC) Not Detected     Enterotoxigenic E. coli (ETEC) lt/st Not Detected     Shiga-like toxin-producing E. coli (STEC) stx1/stx2 Not Detected     Shigella/Enteroinvasive E. coli (EIEC) Not Detected     Cryptosporidium Not Detected     Cyclospora cayetanensis Not Detected     Entamoeba histolytica Not Detected     Giardia lamblia Not Detected     Adenovirus F40/41 Not Detected     Astrovirus Not Detected     Norovirus GI/GII Not Detected     Rotavirus A Not Detected     Sapovirus (I, II, IV or V) Not Detected    Clostridioides difficile Toxin - Stool, Per Rectum [[id number]]  (Normal) Collected: 09/13/23 0920    Lab Status: Final result Specimen: Stool from Per Rectum Updated: 09/13/23 1046    Narrative:      The following orders were created for panel order Clostridioides difficile Toxin - Stool, Per Rectum.  Procedure                               Abnormality         Status                     ---------                               -----------         ------                     Clostridioides difficile...[[id number]]  Normal              Final result                 Please view results for these tests on the individual orders.    Clostridioides difficile Toxin, PCR - Stool, Per Rectum [[id number]]  (Normal) Collected: 09/13/23 0920    Lab Status: Final result Specimen: Stool from Per Rectum Updated: 09/13/23 1046     Toxigenic C. difficile by PCR Not Detected    Narrative:      The result indicates the absence of toxigenic C. difficile from stool specimen.     COVID PRE-OP / PRE-PROCEDURE SCREENING ORDER (NO ISOLATION) - Swab, Nasopharynx [[id number]]  (Normal) Collected: 09/12/23 3834    Lab Status: Final result Specimen: Swab from Nasopharynx Updated: 09/12/23 1631    Narrative:      The following orders were created for panel order COVID PRE-OP / PRE-PROCEDURE SCREENING ORDER (NO ISOLATION) - Swab,  Nasopharynx.  Procedure                               Abnormality         Status                     ---------                               -----------         ------                     COVID-19 and FLU A/B PCR...[[id number]]  Normal              Final result                 Please view results for these tests on the individual orders.    COVID-19 and FLU A/B PCR - Swab, Nasopharynx [[id number]]  (Normal) Collected: 09/12/23 1554    Lab Status: Final result Specimen: Swab from Nasopharynx Updated: 09/12/23 1631     COVID19 Not Detected     Influenza A PCR Not Detected     Influenza B PCR Not Detected    Narrative:      Fact sheet for providers: https://www.fda.gov/media/711187/download    Fact sheet for patients: https://www.fda.gov/media/719262/download    Test performed by PCR.            US Abdomen Complete    Result Date: 9/14/2023  US ABDOMEN COMPLETE Date of Exam: 9/14/2023 7:22 AM EDT Indication: fever unknown origin, vomiting and diarrhea. Comparison: No comparisons available. Technique: Routine gray scale and color Doppler imaging of the complete abdomen was performed according to routine protocol. Findings: The abdominal aorta is normal in size. The intrahepatic IVC is not well seen. The pancreas is normal in size and echogenicity. There is fatty infiltration of the liver. There is poor penetration of the sound beam. There are no focal liver lesions or bile  duct dilatation. There is no gallbladder dilatation, wall thickening, or cholelithiasis. There is a 2 mm polyp along the gallbladder wall. There is no pericholecystic fluid or edema. There are no renal lesions or hydronephrosis. The spleen is at the upper limits of normal in size.     Impression: Impression: Borderline splenomegaly. Fatty liver. Electronically Signed: Joy Salgado MD  9/14/2023 8:06 AM EDT  Workstation ID: [id number]    CT Abdomen Pelvis With Contrast    Result Date: 9/12/2023  CT ABDOMEN PELVIS W CONTRAST Date of Exam: 9/12/2023 4:37  PM EDT Indication: N/V/D, weight loss. Comparison: None available. Technique: Axial CT images were obtained of the abdomen and pelvis following the uneventful intravenous administration of 85 mL Isovue-300. Reconstructed coronal and sagittal images were also obtained. Automated exposure control and iterative construction methods were used. Findings: Lower thorax: Lung bases are clear. Liver: No focal hepatic lesion on this single phase exam. Hepatic steatosis. Gallbladder and bile ducts: Gallbladder is unremarkable. No biliary ductal dilatation. Pancreas: No pancreatic duct dilation. No surrounding inflammation. Spleen: Spleen is mildly enlarged measuring 14 cm. Adrenal glands: No discrete adrenal nodule. Kidneys: No hydronephrosis. No suspicious renal lesions. Urinary bladder: Unremarkable. Reproductive organs: Prostate calcifications. Stomach and bowel: No evidence of bowel obstruction. Diverticulosis. Normal appendix. Lymph nodes: No pathologically enlarged lymph nodes. Vessels: No abdominal aortic aneurysm. Peritoneum and retroperitoneum: No free air or free fluid. Soft tissues: Unremarkable. Osseous structures: No suspicious osseous lesions.     Impression: Impression: Hepatic steatosis and mild splenomegaly. Otherwise, no evidence of acute abnormality in the abdomen or pelvis. Electronically Signed: Desmond Solis MD  9/12/2023 4:58 PM EDT  Workstation ID: [id number]    XR Chest 1 View    Result Date: 9/14/2023  XR CHEST 1 VW Date of Exam: 9/14/2023 3:35 AM EDT Indication: pneumonia Comparison: 9/12/2023 Findings: The heart size is normal. The pulmonary vascular pattern is normal. There is mild right basilar atelectasis. The lungs are otherwise clear.     Impression: Impression: Mild right basilar discoid atelectasis otherwise negative chest Electronically Signed: Ruben Schultz MD  9/14/2023 7:15 AM EDT  Workstation ID: [id number]    XR Chest 1 View    Result Date: 9/12/2023  XR CHEST 1 VW Date of Exam: 9/12/2023  9:02 PM EDT Indication: fever Comparison: None available. Findings: Cardiomediastinal silhouette is within normal limits. Mild right hemidiaphragm elevation. No focal consolidation or overt pulmonary edema. No pleural effusion or pneumothorax. Osseous structures are intact.     Impression: Impression: No evidence of acute cardiopulmonary disease. Electronically Signed: Desmond Solis MD  9/12/2023 9:14 PM EDT  Workstation ID: [id number]         Current medications:  Scheduled Meds:doxycycline, 100 mg, Oral, Q12H  heparin (porcine), 5,000 Units, Subcutaneous, Q8H  insulin lispro, 2-7 Units, Subcutaneous, 4x Daily AC & at Bedtime  lactobacillus acidophilus, 1 capsule, Oral, Daily  piperacillin-tazobactam, 3.375 g, Intravenous, Q8H  sodium chloride, 10 mL, Intravenous, Q12H  vancomycin, 15 mg/kg, Intravenous, Q24H      Continuous Infusions:Pharmacy to dose vancomycin,   sodium chloride, 100 mL/hr, Last Rate: 100 mL/hr (09/13/23 0645)      PRN Meds:.  acetaminophen    Calcium Replacement - Follow Nurse / BPA Driven Protocol    dextrose    dextrose    glucagon (human recombinant)    Magnesium Standard Dose Replacement - Follow Nurse / BPA Driven Protocol    nitroglycerin    ondansetron **OR** ondansetron    Pharmacy to dose vancomycin    Phosphorus Replacement - Follow Nurse / BPA Driven Protocol    Potassium Replacement - Follow Nurse / BPA Driven Protocol    Sodium Chloride (PF)    sodium chloride    sodium chloride    Assessment & Plan   Assessment & Plan     Active Hospital Problems    Diagnosis  POA    **Fever [R50.9]  Yes    Acute UTI (urinary tract infection) [N39.0]  Yes    Hyponatremia [E87.1]  Yes    Lactic acidosis [E87.20]  Yes    Hyperglycemia [R73.9]  Yes    Elevated LFTs [R79.89]  Yes    Bandemia [D72.825]  Yes      Resolved Hospital Problems   No resolved problems to display.        Brief Hospital Course to date:  Peter Faye is a 68 y.o. male with hx of kidney stones who presents due to 1 month history of  N/V/D associated with fevers and chills. Fevers up to 102.3. Has lows 8 lbs in 2 weeks, only tolerating popsicles and ice cream    This patient's problems and plans were partially entered by my partner and updated as appropriate by me 09/14/23. Copied text in this note has been reviewed and is accurate as of today's date.     Fever of unknown origin  Sepsis, POA  --On admission had elevated procal, lactate, bandemia, fever  -UA seemed indicative of a UTI but not enough to explain the severity of symptoms and his urine culture has now returned negative  -GI PCR panel negative, C. difficile negative, blood cultures NGTD, COVID/Flu negative  -CT A/P showed hepatic steatosis and mild splenomegaly but otherwise no acute abnormality; abdominal ultrasound with fatty liver and borderline splenomegaly   -Given underlying suspicion for infection but without clear source, ID consulted; Dr. Whiteside concerned for tickborne illness due to hx of extensive outdoor exposure  --CMV, EBV, tickborne serologies pending  --Continue Vanc/Zosyn, empiric Doxycycline added  --Monitor fever trend     Intractable nausea/diarrhea  Elevated transaminases, improved  Fatty liver  --Reportedly only able to tolerate popsicles and small amounts of food, has had weight loss  --So far, GI infectious etiology negative as above  --Negative acute hepatitis panel, CT A/P and abdominal US results as above  --GI following    Thrombocytopenia  --Platelets trending down today, monitor     DM2  --HbA1c is 8.0%  --New diagnosis? Will consult DM educator  --SSI for now     Hyponatremia  --Improving with fluids    Expected Discharge Location and Transportation: home  Expected Discharge   Expected Discharge Date: 9/16/2023; Expected Discharge Time:      DVT prophylaxis:  Medical DVT prophylaxis orders are present.     AM-PAC 6 Clicks Score (PT): 21 (09/14/23 0800)    CODE STATUS:   Code Status and Medical Interventions:   Ordered at: 09/12/23 6917     Code Status  (Patient has no pulse and is not breathing):    CPR (Attempt to Resuscitate)     Medical Interventions (Patient has pulse or is breathing):    Full Support       Wanda Jauregui MD  09/14/23

## 2023-09-14 NOTE — PLAN OF CARE
Goal Outcome Evaluation:  Plan of Care Reviewed With: patient, spouse        Progress: improving  Outcome Evaluation: Pt. presents below baseline with ADLs and functional mobility. Limited by nausea and decreased activity tolerance. Skilled OT services warranted to promote return to PLOF. Recommend home with assist at discharge.      Anticipated Discharge Disposition (OT): home with assist

## 2023-09-14 NOTE — PROGRESS NOTES
"GI Daily Progress Note  Subjective:    Chief Complaint:  Follow up recurrent fevers     Denies nausea nor vomiting overnight.   No abdominal pain.   Had 1 loose bowel movement this morning.      TMax 101.1 at 2055 last night.        Objective:    /65 (BP Location: Right arm, Patient Position: Lying)   Pulse 86   Temp 98.8 °F (37.1 °C) (Oral)   Resp 18   Ht 180.3 cm (71\")   Wt 93 kg (205 lb)   SpO2 96%   BMI 28.59 kg/m²     Physical Exam  Constitutional:       General: He is not in acute distress.  Cardiovascular:      Rate and Rhythm: Normal rate and regular rhythm.   Pulmonary:      Effort: Pulmonary effort is normal. No respiratory distress.   Abdominal:      General: Bowel sounds are normal. There is no distension.      Palpations: Abdomen is soft.      Tenderness: There is no abdominal tenderness.   Skin:     General: Skin is warm and dry.   Neurological:      Mental Status: He is alert and oriented to person, place, and time.       Lab  Lab Results   Component Value Date    WBC 7.31 09/14/2023    HGB 12.2 (L) 09/14/2023    HGB 12.5 (L) 09/13/2023    HGB 14.9 09/12/2023    MCV 88.5 09/14/2023     (L) 09/14/2023       Lab Results   Component Value Date    GLUCOSE 111 (H) 09/14/2023    BUN 11 09/14/2023    CREATININE 0.90 09/14/2023    BCR 12.2 09/14/2023     (L) 09/14/2023    K 3.7 09/14/2023    CO2 24.0 09/14/2023    CALCIUM 7.9 (L) 09/14/2023    ALBUMIN 2.6 (L) 09/14/2023    ALKPHOS 63 09/14/2023    BILITOT 0.9 09/14/2023    ALT 38 09/14/2023    AST 49 (H) 09/14/2023     Abdominal ultrasound:  Findings:  The abdominal aorta is normal in size. The intrahepatic IVC is not well seen. The pancreas is normal in size and echogenicity. There is fatty infiltration of the liver. There is poor penetration of the sound beam. There are no focal liver lesions or bile   duct dilatation. There is no gallbladder dilatation, wall thickening, or cholelithiasis. There is a 2 mm polyp along the " gallbladder wall. There is no pericholecystic fluid or edema. There are no renal lesions or hydronephrosis. The spleen is at the   upper limits of normal in size.   IMPRESSION:  Impression:  Borderline splenomegaly. Fatty liver.    Assessment:    Recurrent fever   Nausea and vomiting, resolved.     Diarrhea.  GI Panel PCR and C. Difficile were negative.      Hepatic steatosis on CT.   Mildly elevated LFTs.   Possibly COELHO.   Patient denies any alcohol use.  Altered mental status with intermittent confusion/short term memory loss     Plan:    Abdominal ultrasound shows fatty liver and borderline splenomegaly.    His nausea and vomiting have resolved.    He is having 1 loose bowel movement daily.       >> Await Strongyloides Ab and Celiac markers  >> Await CMV IgM, DNA and EBV   >> Autoimmune hepatitis work up pending  >> ID is following     JACINDA Rosa  09/14/23  13:31 EDT

## 2023-09-15 ENCOUNTER — READMISSION MANAGEMENT (OUTPATIENT)
Dept: CALL CENTER | Facility: HOSPITAL | Age: 68
End: 2023-09-15
Payer: MEDICARE

## 2023-09-15 VITALS
RESPIRATION RATE: 16 BRPM | SYSTOLIC BLOOD PRESSURE: 121 MMHG | HEART RATE: 85 BPM | BODY MASS INDEX: 28.7 KG/M2 | WEIGHT: 205 LBS | TEMPERATURE: 98.3 F | DIASTOLIC BLOOD PRESSURE: 72 MMHG | OXYGEN SATURATION: 95 % | HEIGHT: 71 IN

## 2023-09-15 PROBLEM — E11.9 TYPE 2 DIABETES MELLITUS WITHOUT COMPLICATION, WITHOUT LONG-TERM CURRENT USE OF INSULIN: Status: ACTIVE | Noted: 2023-09-12

## 2023-09-15 PROBLEM — E87.1 HYPONATREMIA: Status: RESOLVED | Noted: 2023-09-12 | Resolved: 2023-09-15

## 2023-09-15 PROBLEM — D72.825 BANDEMIA: Status: RESOLVED | Noted: 2023-09-12 | Resolved: 2023-09-15

## 2023-09-15 PROBLEM — R79.89 ELEVATED LFTS: Status: RESOLVED | Noted: 2023-09-12 | Resolved: 2023-09-15

## 2023-09-15 PROBLEM — R50.9 FEVER: Status: RESOLVED | Noted: 2023-09-12 | Resolved: 2023-09-15

## 2023-09-15 PROBLEM — E87.20 LACTIC ACIDOSIS: Status: RESOLVED | Noted: 2023-09-12 | Resolved: 2023-09-15

## 2023-09-15 LAB
ALBUMIN SERPL-MCNC: 2.7 G/DL (ref 3.5–5.2)
ALBUMIN/GLOB SERPL: 0.8 G/DL
ALP SERPL-CCNC: 69 U/L (ref 39–117)
ALT SERPL W P-5'-P-CCNC: 34 U/L (ref 1–41)
ANA SER QL: NEGATIVE
ANION GAP SERPL CALCULATED.3IONS-SCNC: 8 MMOL/L (ref 5–15)
AST SERPL-CCNC: 47 U/L (ref 1–40)
B BURGDOR IGG+IGM SER QL IA: NEGATIVE
BASOPHILS # BLD MANUAL: 0 10*3/MM3 (ref 0–0.2)
BASOPHILS NFR BLD MANUAL: 0 % (ref 0–1.5)
BILIRUB SERPL-MCNC: 0.8 MG/DL (ref 0–1.2)
BUN SERPL-MCNC: 11 MG/DL (ref 8–23)
BUN/CREAT SERPL: 11.6 (ref 7–25)
CALCIUM SPEC-SCNC: 7.8 MG/DL (ref 8.6–10.5)
CHLORIDE SERPL-SCNC: 101 MMOL/L (ref 98–107)
CMV IGG SERPL IA-ACNC: >10 U/ML (ref 0–0.59)
CMV IGM SERPL IA-ACNC: <30 AU/ML (ref 0–29.9)
CO2 SERPL-SCNC: 26 MMOL/L (ref 22–29)
CREAT SERPL-MCNC: 0.95 MG/DL (ref 0.76–1.27)
D-LACTATE SERPL-SCNC: 1.4 MMOL/L (ref 0.5–2)
DEPRECATED RDW RBC AUTO: 43.9 FL (ref 37–54)
DEPRECATED RDW RBC AUTO: 44.2 FL (ref 37–54)
EBV NA IGG SER IA-ACNC: >600 U/ML (ref 0–17.9)
EBV VCA IGG SER IA-ACNC: 212 U/ML (ref 0–17.9)
EBV VCA IGM SER IA-ACNC: 43.7 U/ML (ref 0–35.9)
EBV VCA IGM SER IA-ACNC: <36 U/ML (ref 0–35.9)
EGFRCR SERPLBLD CKD-EPI 2021: 87.2 ML/MIN/1.73
EOSINOPHIL # BLD MANUAL: 0 10*3/MM3 (ref 0–0.4)
EOSINOPHIL NFR BLD MANUAL: 0 % (ref 0.3–6.2)
ERYTHROCYTE [DISTWIDTH] IN BLOOD BY AUTOMATED COUNT: 13.5 % (ref 12.3–15.4)
ERYTHROCYTE [DISTWIDTH] IN BLOOD BY AUTOMATED COUNT: 13.7 % (ref 12.3–15.4)
GLOBULIN UR ELPH-MCNC: 3.4 GM/DL
GLUCOSE BLDC GLUCOMTR-MCNC: 100 MG/DL (ref 70–130)
GLUCOSE BLDC GLUCOMTR-MCNC: 118 MG/DL (ref 70–130)
GLUCOSE SERPL-MCNC: 115 MG/DL (ref 65–99)
HCT VFR BLD AUTO: 33.6 % (ref 37.5–51)
HCT VFR BLD AUTO: 34 % (ref 37.5–51)
HGB BLD-MCNC: 11.3 G/DL (ref 13–17.7)
HGB BLD-MCNC: 11.5 G/DL (ref 13–17.7)
LYMPHOCYTES # BLD MANUAL: 4.35 10*3/MM3 (ref 0.7–3.1)
LYMPHOCYTES NFR BLD MANUAL: 10 % (ref 5–12)
M PNEUMO IGG SER IA-ACNC: <100 U/ML (ref 0–99)
M PNEUMO IGM SER IA-ACNC: <770 U/ML (ref 0–769)
MCH RBC QN AUTO: 29.6 PG (ref 26.6–33)
MCH RBC QN AUTO: 29.7 PG (ref 26.6–33)
MCHC RBC AUTO-ENTMCNC: 33.6 G/DL (ref 31.5–35.7)
MCHC RBC AUTO-ENTMCNC: 33.8 G/DL (ref 31.5–35.7)
MCV RBC AUTO: 87.9 FL (ref 79–97)
MCV RBC AUTO: 88 FL (ref 79–97)
MITOCHONDRIA M2 IGG SER-ACNC: <20 UNITS (ref 0–20)
MONOCYTES # BLD: 0.84 10*3/MM3 (ref 0.1–0.9)
NEUTROPHILS # BLD AUTO: 3.18 10*3/MM3 (ref 1.7–7)
NEUTROPHILS NFR BLD MANUAL: 29 % (ref 42.7–76)
NEUTS BAND NFR BLD MANUAL: 9 % (ref 0–5)
NRBC SPEC MANUAL: 0 /100 WBC (ref 0–0.2)
PLATELET # BLD AUTO: 100 10*3/MM3 (ref 140–450)
PLATELET # BLD AUTO: 126 10*3/MM3 (ref 140–450)
PMV BLD AUTO: 10.9 FL (ref 6–12)
PMV BLD AUTO: 11.7 FL (ref 6–12)
POTASSIUM SERPL-SCNC: 3.8 MMOL/L (ref 3.5–5.2)
PROT SERPL-MCNC: 6.1 G/DL (ref 6–8.5)
RBC # BLD AUTO: 3.82 10*6/MM3 (ref 4.14–5.8)
RBC # BLD AUTO: 3.87 10*6/MM3 (ref 4.14–5.8)
RBC MORPH BLD: NORMAL
SERVICE CMNT-IMP: ABNORMAL
SMA IGG SER-ACNC: 30 UNITS (ref 0–19)
SMALL PLATELETS BLD QL SMEAR: ABNORMAL
SMUDGE CELLS BLD QL SMEAR: ABNORMAL
SODIUM SERPL-SCNC: 135 MMOL/L (ref 136–145)
TTG IGA SER-ACNC: <2 U/ML (ref 0–3)
VANCOMYCIN SERPL-MCNC: <4 MCG/ML (ref 5–40)
VARIANT LYMPHS NFR BLD MANUAL: 23 % (ref 19.6–45.3)
VARIANT LYMPHS NFR BLD MANUAL: 29 % (ref 0–5)
WBC NRBC COR # BLD: 8.37 10*3/MM3 (ref 3.4–10.8)
WBC NRBC COR # BLD: 8.78 10*3/MM3 (ref 3.4–10.8)

## 2023-09-15 PROCEDURE — 83605 ASSAY OF LACTIC ACID: CPT | Performed by: INTERNAL MEDICINE

## 2023-09-15 PROCEDURE — 80053 COMPREHEN METABOLIC PANEL: CPT | Performed by: HOSPITALIST

## 2023-09-15 PROCEDURE — 84165 PROTEIN E-PHORESIS SERUM: CPT | Performed by: INTERNAL MEDICINE

## 2023-09-15 PROCEDURE — 82948 REAGENT STRIP/BLOOD GLUCOSE: CPT

## 2023-09-15 PROCEDURE — 85007 BL SMEAR W/DIFF WBC COUNT: CPT | Performed by: INTERNAL MEDICINE

## 2023-09-15 PROCEDURE — 85027 COMPLETE CBC AUTOMATED: CPT | Performed by: HOSPITALIST

## 2023-09-15 PROCEDURE — 80202 ASSAY OF VANCOMYCIN: CPT | Performed by: PHARMACIST

## 2023-09-15 PROCEDURE — 99231 SBSQ HOSP IP/OBS SF/LOW 25: CPT | Performed by: PHYSICIAN ASSISTANT

## 2023-09-15 PROCEDURE — 25010000002 HEPARIN (PORCINE) PER 1000 UNITS: Performed by: STUDENT IN AN ORGANIZED HEALTH CARE EDUCATION/TRAINING PROGRAM

## 2023-09-15 PROCEDURE — 85027 COMPLETE CBC AUTOMATED: CPT | Performed by: INTERNAL MEDICINE

## 2023-09-15 PROCEDURE — 99238 HOSP IP/OBS DSCHRG MGMT 30/<: CPT | Performed by: HOSPITALIST

## 2023-09-15 RX ORDER — CEFUROXIME AXETIL 250 MG/1
250 TABLET ORAL 2 TIMES DAILY
Qty: 14 TABLET | Refills: 0 | Status: SHIPPED | OUTPATIENT
Start: 2023-09-15 | End: 2023-09-22

## 2023-09-15 RX ORDER — L.ACID,PARA/B.BIFIDUM/S.THERM 8B CELL
1 CAPSULE ORAL DAILY
Qty: 7 CAPSULE | Refills: 0 | Status: SHIPPED | OUTPATIENT
Start: 2023-09-16 | End: 2023-09-23

## 2023-09-15 RX ORDER — DOXYCYCLINE 100 MG/1
100 CAPSULE ORAL EVERY 12 HOURS SCHEDULED
Qty: 14 CAPSULE | Refills: 0 | Status: SHIPPED | OUTPATIENT
Start: 2023-09-15 | End: 2023-09-22

## 2023-09-15 RX ADMIN — Medication 10 ML: at 09:04

## 2023-09-15 RX ADMIN — Medication 1 CAPSULE: at 09:04

## 2023-09-15 RX ADMIN — HEPARIN SODIUM 5000 UNITS: 5000 INJECTION, SOLUTION INTRAVENOUS; SUBCUTANEOUS at 06:14

## 2023-09-15 RX ADMIN — DOXYCYCLINE 100 MG: 100 CAPSULE ORAL at 09:04

## 2023-09-15 NOTE — CONSULTS
"Diabetes Education  Assessment/Teaching    Patient Name:  Peter Faye  YOB: 1955  MRN: [id number]  Admit Date:  9/12/2023      Assessment Date:  9/15/2023  Flowsheet Row Most Recent Value   General Information     Referral From: A1c  [8.0]   Height 180.3 cm (71\")   Height Method Stated   Weight 93 kg (205 lb)   Weight Method Stated   Have you had weight changes? Yes   Describe weight changes lost 8 lbs in 2 weeks   Are you currently involved in an activity/exercise program?  No  [\"very active daily living\"]   Do you have high blood pressure? no   Patient expressed need understanding diabetes, diet information   Is patient pregnant? n/a   Pregnancy Assessment    Diabetes History    What type of diabetes do you have? Unknown   Length of Diabetes Diagnosis Newly diagnosed <6 months   Current DM knowledge fair   Have you had diabetes education/teaching in the past? no   Do you test your blood sugar at home? no   Have you had low blood sugar? (<70mg/dl) no   Have you had high blood sugar? (>140mg/dl) no   Education Preferences    What areas of diabetes would you like to learn about? avoiding high blood sugar, avoiding low blood sugar, diabetes complications, diet information, understanding diabetes, resources on diabetes   Nutrition Information    Assessment Topics    Healthy Eating - Assessment Needs education   Being Active - Assessment Needs education   Taking Medication - Assessment N/A-unable to assess   Problem Solving - Assessment Needs education   Reducing Risk - Assessment Needs education   Healthy Coping - Assessment Competent   Monitoring - Assessment Needs education   DM Goals    Contact Plan Follow-up medical care  [states has appt to establish care w/ PCP on October 12]            Flowsheet Row Most Recent Value   DM Education Needs    Meter Needs meter  [discussed over-the-counter options]   Meter Type Other (comment)  [Reli-on]   Frequency of Testing 2 times a day   Blood Glucose Target " "Range    Medication Other (comment)  [no meds rx at this time]   Problem Solving Hypoglycemia, Hyperglycemia, Signs, Symptoms, Treatment   Reducing Risks A1C testing, Eye exam   Healthy Eating Reviewed meal plan  [plate method]   Physical Activity Walking   Physical Activity Frequency Regularly   Healthy Coping Appropriate   Discharge Plan Home   Motivation Engaged, Strong   Teaching Method Explanation, Discussion, Teach back   Patient Response Verbalized understanding              Other Comments:  Total time spent reviewing chart, preparing education/materials, providing education at bedside, and coordinating care approx 55 minutes.    Consult for diabetes education received for A1c>7, new diagnosis. Chart reviewed. Pt was seen at bedside today. Permission given for visit.     Discussed and taught Mr. Faye about type 2 diabetes self-management, risk factors, and importance of blood glucose control to reduce complications. Target blood glucose readings and A1c goals per ADA were reviewed. Reviewed with patient current A1c 8.0 and discussed its significance. Signs, symptoms, and treatment of hyperglycemia and hypoglycemia were discussed. Rule of 15's reviewed.    Pt reports + family history of diabetes. Shares that he is motivated to make lifestyle changes- diet and exercise- to manage his blood glucose. Described A1c in terms of EAG, ADA rec target A1c <7 to help reduce risk of complications. Pt expresses that he feels very motivated to make changes.    Pt shares typical eating pattern is breakfast (1 egg, 1 pc toast, segovia, orange juice) lunch (bologna sandwich, chips, snack cake, 1 soda) and supper (meat, potatoes, bread, 1 soda). Reviewed addition of vegetables (non-starchy vs starchy described to him), as well as avoidance of sugary beverages. Pt states \"I can stop drinking soft drinks.\" Encouraged increased water intake, trying \"zero sugar\" version of soft drinks. Talked about carbs, protein, fat, how " "each affects blood sugar, and why sugary drinks should be avoided in helping manage blood sugar levels. Discussed protein and brainstormed snack and meal ideas with him. Reviewed the plate method for balanced, healthy eating.     Discussed SMBG at home. Pt feels confident with obtaining OTC meter and self-starting monitoring prior to scheduled PCP appt on 10/12. Reviewed affordable otc option- suggested Reli-on, [employer]. Advised of resources to assist w/ obtaining and using meter, including pharmacy staff and outpatient diabetes education office. Provided contact information in the event pt has concerns or questions after discharge. Encouraged pt to monitor blood sugar at home 1-2  times per day and to call PCP if blood sugar is trending high. Encouraged to keep record of blood glucose readings to take to follow up appointment with PCP. Provided patient with copy of [employer]'s handout, \"Blood Glucose Goals\" handout, and \"What is A1c\" handout, as well as [facility] \"Diabetes Basics\" booklet for his review.    We discussed comprehensive outpatient education offerings, encouraged him to contact us so that we may assist him in getting referral from provider once care is established.    Thank you for this consult.         Electronically signed by:  Erica Rosa RN  09/15/23 11:53 EDT  "

## 2023-09-15 NOTE — CASE MANAGEMENT/SOCIAL WORK
Continued Stay Note   [facility]     Patient Name: Peter Faye  MRN: [id number]  Today's Date: 9/15/2023    Admit Date: 9/12/2023    Plan: home   Discharge Plan       Row Name 09/15/23 0846       Plan    Plan home    Patient/Family in Agreement with Plan yes    Plan Comments I spoke with the patient and his wife at the bedside. His plan remains home at discharge with his wife to transport. She stated that she secured a new PCP appointment for her  after discharge. CM will continue to follow.    Final Discharge Disposition Code 01 - home or self-care                   Discharge Codes    No documentation.                 Expected Discharge Date and Time       Expected Discharge Date Expected Discharge Time    Sep 16, 2023               Robina Mejia RN

## 2023-09-15 NOTE — DISCHARGE SUMMARY
[facility] Medicine Services  DISCHARGE SUMMARY    Patient Name: Peter Faye  : 1955  MRN: [id number]    Date of Admission: 2023  6:34 PM  Date of Discharge:  09/15/23  Primary Care Physician: Provider, No Known    Consults       Date and Time Order Name Status Description    2023 12:42 PM Inpatient Gastroenterology Consult Completed     2023 12:42 PM Inpatient Infectious Diseases Consult Completed             Hospital Course     Presenting Problem: fever, N/V/D    Active Hospital Problems    Diagnosis  POA    Type 2 diabetes mellitus without complication, without long-term current use of insulin [E11.9]  Yes      Resolved Hospital Problems    Diagnosis Date Resolved POA    Fever [R50.9] 09/15/2023 Yes    Hyponatremia [E87.1] 09/15/2023 Yes    Lactic acidosis [E87.20] 09/15/2023 Yes    Elevated LFTs [R79.89] 09/15/2023 Yes    Bandemia [D72.825] 09/15/2023 Yes          Hospital Course:  Peter Faye is a 68 y.o. male with hx of kidney stones who presents due to 1 month history of N/V/D associated with fevers and chills. Fevers up to 102.3. Has lows 8 lbs in 2 weeks, only tolerating popsicles and ice cream.     Fever of unknown origin  Sepsis, POA  --On admission had elevated procal, lactate, bandemia, fever  -UA seemed indicative of a UTI but not enough to explain the severity of symptoms and his urine culture has now returned negative  -GI PCR panel negative, C. difficile negative, blood cultures NGTD, COVID/Flu negative  -CT A/P showed hepatic steatosis and mild splenomegaly but otherwise no acute abnormality; abdominal ultrasound with fatty liver and borderline splenomegaly   -Given underlying suspicion for infection but without clear source, ID consulted; Dr. Whiteside concerned for tickborne illness due to hx of extensive outdoor exposure  --CMV positive IgG but negative IgM  --EBV, tickborne serologies pending  --Initially on Vanc/Zosyn, empiric Doxycycline  added  --Now on Rocephin/Doxycycline  --Afebrile past 24 hours  --Dr. Whiteside recommends 7 day course of PO Ceftin and Doxycycline and F/U in her office in 10 days to go over all pending test results     Intractable nausea/diarrhea  Elevated transaminases, improved  Fatty liver  --Reportedly only able to tolerate popsicles and small amounts of food, has had weight loss  --So far, GI infectious etiology negative as above  --Negative acute hepatitis panel, CT A/P and abdominal US results as above  --GI following; Strongyloides Ab and celiac markers are still pending, as well as autoimmune hepatitis workup  --Significantly improved, ate almost his entire breakfast this morning  --GI will make referral to [facility] in 3-4 weeks     Thrombocytopenia  --Platelets trending down but now trending back up today    Lymphocytosis  --CBC with diff today showing moderate smudge cells and atypical lymphocytes  --Discussed with Dr. Whiteside, likely related to a viral infection, but will need followed up on with PCP as outpatient     DM2  --HbA1c is 8.0%  --New diagnosis  --Has not seen a physician in 10 years  --Discussed with patient and wife, plan at this time is to work on diet/lifestyle modification and F/U with PCP for further management, will not initiate any meds at this time     Hyponatremia  --Improved with fluids      Discharge Follow Up Recommendations for outpatient labs/diagnostics:  F/U with PCP in 1 week with repeat CBC with diff  F/U with Dr. Whiteside, ID in 10 days  F/U with [facility] in 3-4 weeks    Day of Discharge     HPI:   Patient seen this morning. Eating breakfast. Appetite much improved. He feels better overall. Normal BM today.    Review of Systems  Gen-no fevers, no chills  CV-no chest pain, no palpitations  Resp-no cough, no dyspnea  GI-no N/V/D, no abd pain      Vital Signs:   Temp:  [98.1 °F (36.7 °C)-99.8 °F (37.7 °C)] 98.1 °F (36.7 °C)  Heart Rate:  [76-89] 76  Resp:  [16] 16  BP:  ()/(50-64) 101/64      Physical Exam:  Gen-no acute distress  HENT-NCAT, mucous membranes moist  CV-RRR, S1 S2 normal, no m/r/g  Resp-CTAB, no wheezes or rales  Abd-soft, NT, ND, +BS  Ext-no edema  Neuro-A&Ox3, no focal deficits  Skin-no rashes  Psych-appropriate mood      Pertinent  and/or Most Recent Results     LAB RESULTS:      Lab 09/15/23  0338 09/14/23  0304 09/13/23  1159 09/13/23  0416 09/12/23  2355 09/12/23  2158 09/12/23  1554   WBC 8.78 7.31  --  8.55  --   --  10.25   HEMOGLOBIN 11.3* 12.2*  --  12.5*  --   --  14.9   HEMATOCRIT 33.6* 36.0*  --  36.4*  --   --  44.7   PLATELETS 126* 119*  --  143  --   --  155   NEUTROS ABS  --  3.73  --  5.47  --   --  7.18*   IMMATURE GRANS (ABS)  --   --   --  0.04  --   --   --    LYMPHS ABS  --   --   --  2.25  --   --   --    MONOS ABS  --   --   --  0.77  --   --   --    EOS ABS  --  0.00  --  0.00  --   --  0.00   MCV 88.0 88.5  --  87.3  --   --  89.2   CRP  --   --  10.78*  --   --   --   --    PROCALCITONIN  --   --   --   --   --   --  0.73*   LACTATE 1.4  --   --  2.2* 1.8 1.7 3.2*         Lab 09/15/23  0338 09/14/23  1146 09/14/23 0304 09/13/23 2328 09/13/23 1759 09/13/23 1159 09/13/23 0416 09/12/23  2355   SODIUM 135* 134* 133* 133* 133*   < > 132*  --    POTASSIUM 3.8 3.7 4.0 3.9 3.8   < > 4.2  --    CHLORIDE 101 101 101 101 100   < > 97*  --    CO2 26.0 24.0 24.0 23.0 22.0   < > 24.0  --    ANION GAP 8.0 9.0 8.0 9.0 11.0   < > 11.0  --    BUN 11 11 12 13 14   < > 15  --    CREATININE 0.95 0.90 1.01 0.96 1.05   < > 1.17  --    EGFR 87.2 93.0 81.0 86.1 77.3   < > 67.9  --    GLUCOSE 115* 111* 120* 137* 187*   < > 179*  --    CALCIUM 7.8* 7.9* 7.9* 7.8* 7.9*   < > 8.2*  --    MAGNESIUM  --   --   --   --   --   --  2.1  --    PHOSPHORUS  --   --  2.8  --  2.2*  --  2.2*  --    HEMOGLOBIN A1C  --   --   --   --   --   --   --  8.00*   TSH  --   --   --   --   --   --  0.891  --     < > = values in this interval not displayed.         Lab  09/15/23  0338 09/14/23  0304 09/13/23  0416 09/12/23  1554   TOTAL PROTEIN 6.1 6.5 7.3 8.7*   ALBUMIN 2.7* 2.6* 3.0* 3.5   GLOBULIN 3.4 3.9 4.3 5.2   ALT (SGPT) 34 38 46* 56*   AST (SGOT) 47* 49* 51* 58*   BILIRUBIN 0.8 0.9 1.1 1.3*   ALK PHOS 69 63 72 88   LIPASE  --   --   --  32                     Brief Urine Lab Results  (Last result in the past 365 days)        Color   Clarity   Blood   Leuk Est   Nitrite   Protein   CREAT   Urine HCG        09/12/23 1554 Dark Yellow   Clear   Negative   Trace   Negative   100 mg/dL (2+)                 Microbiology Results (last 10 days)       Procedure Component Value - Date/Time    Gastrointestinal Panel, PCR - Stool, Per Rectum [[id number]]  (Normal) Collected: 09/13/23 0920    Lab Status: Final result Specimen: Stool from Per Rectum Updated: 09/13/23 1110     Campylobacter Not Detected     Plesiomonas shigelloides Not Detected     Salmonella Not Detected     Vibrio Not Detected     Vibrio cholerae Not Detected     Yersinia enterocolitica Not Detected     Enteroaggregative E. coli (EAEC) Not Detected     Enteropathogenic E. coli (EPEC) Not Detected     Enterotoxigenic E. coli (ETEC) lt/st Not Detected     Shiga-like toxin-producing E. coli (STEC) stx1/stx2 Not Detected     Shigella/Enteroinvasive E. coli (EIEC) Not Detected     Cryptosporidium Not Detected     Cyclospora cayetanensis Not Detected     Entamoeba histolytica Not Detected     Giardia lamblia Not Detected     Adenovirus F40/41 Not Detected     Astrovirus Not Detected     Norovirus GI/GII Not Detected     Rotavirus A Not Detected     Sapovirus (I, II, IV or V) Not Detected    Clostridioides difficile Toxin - Stool, Per Rectum [[id number]]  (Normal) Collected: 09/13/23 0920    Lab Status: Final result Specimen: Stool from Per Rectum Updated: 09/13/23 1046    Narrative:      The following orders were created for panel order Clostridioides difficile Toxin - Stool, Per Rectum.  Procedure                                Abnormality         Status                     ---------                               -----------         ------                     Clostridioides difficile...[[id number]]  Normal              Final result                 Please view results for these tests on the individual orders.    Clostridioides difficile Toxin, PCR - Stool, Per Rectum [[id number]]  (Normal) Collected: 09/13/23 0920    Lab Status: Final result Specimen: Stool from Per Rectum Updated: 09/13/23 1046     Toxigenic C. difficile by PCR Not Detected    Narrative:      The result indicates the absence of toxigenic C. difficile from stool specimen.     Blood Culture - Blood, Arm, Right [[id number]]  (Normal) Collected: 09/12/23 2200    Lab Status: Preliminary result Specimen: Blood from Arm, Right Updated: 09/14/23 2230     Blood Culture No growth at 2 days    Blood Culture - Blood, Arm, Right [[id number]]  (Normal) Collected: 09/12/23 2200    Lab Status: Preliminary result Specimen: Blood from Arm, Right Updated: 09/14/23 2230     Blood Culture No growth at 2 days    COVID PRE-OP / PRE-PROCEDURE SCREENING ORDER (NO ISOLATION) - Swab, Nasopharynx [[id number]]  (Normal) Collected: 09/12/23 1554    Lab Status: Final result Specimen: Swab from Nasopharynx Updated: 09/12/23 1631    Narrative:      The following orders were created for panel order COVID PRE-OP / PRE-PROCEDURE SCREENING ORDER (NO ISOLATION) - Swab, Nasopharynx.  Procedure                               Abnormality         Status                     ---------                               -----------         ------                     COVID-19 and FLU A/B PCR...[[id number]]  Normal              Final result                 Please view results for these tests on the individual orders.    COVID-19 and FLU A/B PCR - Swab, Nasopharynx [[id number]]  (Normal) Collected: 09/12/23 1554    Lab Status: Final result Specimen: Swab from Nasopharynx Updated: 09/12/23 1631     COVID19 Not Detected     Influenza  A PCR Not Detected     Influenza B PCR Not Detected    Narrative:      Fact sheet for providers: https://www.fda.gov/media/302289/download    Fact sheet for patients: https://www.fda.gov/media/147859/download    Test performed by PCR.    Urine Culture - Urine, Urine, Clean Catch [[id number]]  (Normal) Collected: 09/12/23 1554    Lab Status: Final result Specimen: Urine, Clean Catch Updated: 09/14/23 0801     Urine Culture No growth            US Abdomen Complete    Result Date: 9/14/2023  US ABDOMEN COMPLETE Date of Exam: 9/14/2023 7:22 AM EDT Indication: fever unknown origin, vomiting and diarrhea. Comparison: No comparisons available. Technique: Routine gray scale and color Doppler imaging of the complete abdomen was performed according to routine protocol. Findings: The abdominal aorta is normal in size. The intrahepatic IVC is not well seen. The pancreas is normal in size and echogenicity. There is fatty infiltration of the liver. There is poor penetration of the sound beam. There are no focal liver lesions or bile  duct dilatation. There is no gallbladder dilatation, wall thickening, or cholelithiasis. There is a 2 mm polyp along the gallbladder wall. There is no pericholecystic fluid or edema. There are no renal lesions or hydronephrosis. The spleen is at the upper limits of normal in size.     Impression: Borderline splenomegaly. Fatty liver. Electronically Signed: Joy Slagado MD  9/14/2023 8:06 AM EDT  Workstation ID: [id number]    CT Abdomen Pelvis With Contrast    Result Date: 9/12/2023  CT ABDOMEN PELVIS W CONTRAST Date of Exam: 9/12/2023 4:37 PM EDT Indication: N/V/D, weight loss. Comparison: None available. Technique: Axial CT images were obtained of the abdomen and pelvis following the uneventful intravenous administration of 85 mL Isovue-300. Reconstructed coronal and sagittal images were also obtained. Automated exposure control and iterative construction methods were used. Findings: Lower thorax:  Lung bases are clear. Liver: No focal hepatic lesion on this single phase exam. Hepatic steatosis. Gallbladder and bile ducts: Gallbladder is unremarkable. No biliary ductal dilatation. Pancreas: No pancreatic duct dilation. No surrounding inflammation. Spleen: Spleen is mildly enlarged measuring 14 cm. Adrenal glands: No discrete adrenal nodule. Kidneys: No hydronephrosis. No suspicious renal lesions. Urinary bladder: Unremarkable. Reproductive organs: Prostate calcifications. Stomach and bowel: No evidence of bowel obstruction. Diverticulosis. Normal appendix. Lymph nodes: No pathologically enlarged lymph nodes. Vessels: No abdominal aortic aneurysm. Peritoneum and retroperitoneum: No free air or free fluid. Soft tissues: Unremarkable. Osseous structures: No suspicious osseous lesions.     Impression: Hepatic steatosis and mild splenomegaly. Otherwise, no evidence of acute abnormality in the abdomen or pelvis. Electronically Signed: Desmond Solis MD  9/12/2023 4:58 PM EDT  Workstation ID: [id number]    XR Chest 1 View    Result Date: 9/14/2023  XR CHEST 1 VW Date of Exam: 9/14/2023 3:35 AM EDT Indication: pneumonia Comparison: 9/12/2023 Findings: The heart size is normal. The pulmonary vascular pattern is normal. There is mild right basilar atelectasis. The lungs are otherwise clear.     Impression: Mild right basilar discoid atelectasis otherwise negative chest Electronically Signed: Ruben Schultz MD  9/14/2023 7:15 AM EDT  Workstation ID: [id number]    XR Chest 1 View    Result Date: 9/12/2023  XR CHEST 1 VW Date of Exam: 9/12/2023 9:02 PM EDT Indication: fever Comparison: None available. Findings: Cardiomediastinal silhouette is within normal limits. Mild right hemidiaphragm elevation. No focal consolidation or overt pulmonary edema. No pleural effusion or pneumothorax. Osseous structures are intact.     Impression: No evidence of acute cardiopulmonary disease. Electronically Signed: Desmond Solis MD  9/12/2023  9:14 PM EDT  Workstation ID: [id number]                 Plan for Follow-up of Pending Labs/Results: ID and GI to follow up  Pending Labs       Order Current Status    YAHAIRA In process    Anti-Smooth Muscle Antibody Titer In process    CBC & Differential In process    CBC Auto Differential In process    CMV DNA, Quantitative, PCR In process    EBV Antibody Profile In process    Moris-Barr Virus VCA, IgM In process    Leptospira Antibody IgM In process    Manual Differential In process    Mitochondrial Antibodies, M2 In process    Mycoplasma Pneu. IgG / IgM Antibodies In process    Protein Electrophoresis, Total In process    Q Fever Antibodies IgG / IgM In process    Rickettsial Fever Group IgG / M In process    Strongyloides Antibody IgG, DANAY In process    Tissue Transglutaminase, IgA In process    Blood Culture - Blood, Arm, Right Preliminary result    Blood Culture - Blood, Arm, Right Preliminary result          Discharge Details        Discharge Medications        New Medications        Instructions Start Date   cefuroxime 250 MG tablet  Commonly known as: CEFTIN   250 mg, Oral, 2 Times Daily      doxycycline 100 MG capsule  Commonly known as: MONODOX   100 mg, Oral, Every 12 Hours Scheduled      lactobacillus acidophilus capsule capsule   1 capsule, Oral, Daily   Start Date: September 16, 2023              No Known Allergies      Discharge Disposition:  Home or Self Care    Diet:  Hospital:  Diet Order   Procedures    Diet: Regular/House Diet; Texture: Regular Texture (IDDSI 7); Fluid Consistency: Thin (IDDSI 0)       Activity:  Activity Instructions       Activity as Tolerated                 CODE STATUS:    Code Status and Medical Interventions:   Ordered at: 09/12/23 9817     Code Status (Patient has no pulse and is not breathing):    CPR (Attempt to Resuscitate)     Medical Interventions (Patient has pulse or is breathing):    Full Support       Future Appointments   Date Time Provider Department Center    10/12/2023  9:15 AM Raghav Whitten MD [facility]  [facility]       Additional Instructions for the Follow-ups that You Need to Schedule       Discharge Follow-up with PCP   As directed       Currently Documented PCP:    Provider, No Known    PCP Phone Number:    None     Follow Up Details: 1 week        Discharge Follow-up with Specified Provider: SHANDA Lin in 10 days   As directed      To: SHANDA Lin in 10 days                      Wanda Jauregui MD  09/15/23      Time Spent on Discharge:  I spent  25  minutes on this discharge activity which included: face-to-face encounter with the patient, reviewing the data in the system, coordination of the care with the nursing staff as well as consultants, documentation, and entering orders.

## 2023-09-15 NOTE — PROGRESS NOTES
"GI Daily Progress Note  Subjective:    Chief Complaint:  \"I am so much better\"     Afebrile overnight.  Consumed 100% of breakfast tray.   Had a normal bowel movement,  denies diarrhea.       Objective:    /64 (BP Location: Left arm, Patient Position: Lying)   Pulse 76   Temp 98.1 °F (36.7 °C) (Oral)   Resp 16   Ht 180.3 cm (71\")   Wt 93 kg (205 lb)   SpO2 95%   BMI 28.59 kg/m²     Physical Exam  Constitutional:       General: He is not in acute distress.  Cardiovascular:      Rate and Rhythm: Normal rate and regular rhythm.   Pulmonary:      Effort: Pulmonary effort is normal. No respiratory distress.   Abdominal:      General: Bowel sounds are normal. There is no distension.      Palpations: Abdomen is soft.      Tenderness: There is no abdominal tenderness.   Neurological:      Mental Status: He is alert and oriented to person, place, and time.       Lab  Lab Results   Component Value Date    WBC 8.78 09/15/2023    HGB 11.3 (L) 09/15/2023    HGB 12.2 (L) 09/14/2023    HGB 12.5 (L) 09/13/2023    MCV 88.0 09/15/2023     (L) 09/15/2023       Lab Results   Component Value Date    GLUCOSE 115 (H) 09/15/2023    BUN 11 09/15/2023    CREATININE 0.95 09/15/2023    BCR 11.6 09/15/2023     (L) 09/15/2023    K 3.8 09/15/2023    CO2 26.0 09/15/2023    CALCIUM 7.8 (L) 09/15/2023    ALBUMIN 2.7 (L) 09/15/2023    ALKPHOS 69 09/15/2023    BILITOT 0.8 09/15/2023    ALT 34 09/15/2023    AST 47 (H) 09/15/2023       Assessment:    Recurrent fever, improved.   Infectious disease following.     Nausea and vomiting, resolved.     Diarrhea.  GI Panel PCR and C. Difficile were negative.   Resolved.       Hepatic steatosis on CT.   Mildly elevated LFTs.   Possibly COELHO.   Patient denies any alcohol use.  Altered mental status with intermittent confusion/short term memory loss, improved.      Plan:    >> Follow up Strongyloides Ab and Celiac markers  >> Follow up autoimmune hepatitis work up pending  >> Viral " studies pending.  ID following.       Discharge is planned for today per primary team.    The patient and his wife request follow up closer to home.   Will refer to [facility] in 3-4 weeks.         JACINDA Rosa  09/15/23  09:01 EDT

## 2023-09-15 NOTE — PROGRESS NOTES
[facility] Progress Note    Admission Date: 9/12/2023    Peter Faye  1955  [id number]    Date: 9/15/2023    Antibiotics:  Anti-Infectives (From admission, onward)      Ordered     Dose/Rate Route Frequency Start Stop    09/15/23 1023  cefuroxime (CEFTIN) 250 MG tablet        Ordering Provider: Wanda Jauregui MD    250 mg Oral 2 Times Daily 09/15/23 0000 09/22/23 2359    09/15/23 1023  doxycycline (MONODOX) 100 MG capsule        Ordering Provider: Wanda Jauregui MD    100 mg Oral Every 12 Hours Scheduled 09/15/23 0000 09/22/23 2359    09/14/23 1621  cefTRIAXone (ROCEPHIN) 2000 mg/100 mL 0.9% NS IVPB (MBP)        Ordering Provider: Eva Whiteside MD    2,000 mg  over 30 Minutes Intravenous Every 24 Hours 09/14/23 1800 09/21/23 1759    09/13/23 1558  doxycycline (MONODOX) capsule 100 mg        Ordering Provider: Eva Whiteside MD    100 mg Oral Every 12 Hours Scheduled 09/13/23 2100 09/23/23 2059    09/12/23 2159  vancomycin 1750 mg/500 mL 0.9% NS IVPB ([facility])        Ordering Provider: Sheri Juares DO    20 mg/kg × 93 kg  over 105 Minutes Intravenous Once 09/12/23 2215 09/13/23 0023    09/12/23 2145  piperacillin-tazobactam (ZOSYN) 3.375 g in iso-osmotic dextrose 50 ml (premix)        Ordering Provider: Sheri Juares DO    3.375 g  over 30 Minutes Intravenous Once 09/12/23 2201 09/12/23 2308          Reason for Consultation: fuo     History of present illness:    Patient is a 68 y.o. male with history of nephrolithiasis who presented to the ED 9/12/23 with a 1 month history of intermittent fever (as high as 102), diarrhea, nausea and vomiting. Over the course of 1 month he lost 8 lbs. He did not notice a rash or joint pain. On arrival at the ED WBC 10 with L shift and 3% atypical lymphocytes, lactic acid 3.2, PCT 0.73, lipase normal and Na 126  Platelets 155-> 143 and transaminases were mildly elevated. He was started on Zosyn and vancomycin. Tm 101.3  His nausea and vomiting are much improved and  "he was able to eat ~ 50% of lunch consisting of chicken and rice. He has continued to have diarrhea. GI panel pcr and cdiff are negative. CT abd significant only for hepatic steatosis and mild splenomegaly  UA with mild pyuria. 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 He removed several ticks earlier this year but not recently No pets at home. He has never lived or worked on a farm. No travel in the last 20 years. 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    9/14/23 Tm 101 last pm No temps recorded since 8 am but he reports that he has been afebrile today  He continues to improve and is tolerating solid food  Platelets down to 119 and 10% bands and smudge cells reported on manual diff    9/15/23 Tm 99.7 Feels much better and is anxious to go home.            Review of Systems:  Constitutional-- + Fever, chills & sweats.  Appetite poor although now improved, + malaise and fatigue.  HEENT-- No new vision, hearing or throat complaints.  No epistaxis or oral sores.  Denies odynophagia or dysphagia. No headache, photophobia or neck stiffness.  CV-- No chest pain, palpitation or syncope  Resp-- No SOB or hemoptysis  Occasional mild nonproductive cough  GI- + nausea &vomiting which are much improved. Ongoing diarrhea which may be improving  No hematochezia, melena, or hematemesis. Denies jaundice or chronic liver disease.  -- No dysuria, hematuria, or flank pain.  Denies hesitancy, urgency or flank pain.  Lymph- no swollen lymph nodes in neck/axilla or groin.   Heme- No active bruising or bleeding; no Hx of DVT or PE.  MS-- no swelling or pain in the bones or joints of arms/legs.  No new back pain.  Neuro-- No acute focal weakness or numbness in the " arms or legs.  No seizures.  Skin--No rashes or lesions        Medical History[]Expand by Default        Past Medical History:   Diagnosis Date    Nephrolithiasis        SURGICAL HISTORY  Procedure for ureteral stone ~ 10 years ago  ORIF leg fracture > 10 years ago       PE:  Vital Signs  Temp  Min: 98.1 °F (36.7 °C)  Max: 99.8 °F (37.7 °C)  BP  Min: 96/50  Max: 104/52  Pulse  Min: 76  Max: 89  Resp  Min: 16  Max: 16  SpO2  Min: 95 %  Max: 96 %  GENERAL: Awake and alert, in no acute distress.   HEENT: Normocephalic, atraumatic.  PERRL. EOMI. No conjunctival injection. No icterus. Oropharynx clear   NECK: Supple without nuchal rigidity. No mass.  LYMPH: No cervical lymphadenopathy.  HEART: RRR; No murmur, rubs, gallops.   LUNGS: Clear to auscultation bilaterally without wheezing, rales, rhonchi. Normal respiratory effort. Nonlabored. No dullness.  ABDOMEN: Soft, nontender, nondistended. Positive bowel sounds. No rebound or guarding. NO mass or HSM.  EXT:  No cyanosis, clubbing or edema. No cord.  :  Without Roper catheter.  MSK: No joint effusions or erythema  SKIN: Warm and dry without cutaneous eruptions on Inspection/palpation.    NEURO: Oriented to PPT.  Motor 5/5 strength  PSYCHIATRIC: Normal insight and judgment. Cooperative with PE     Laboratory Data    Results from last 7 days   Lab Units 09/15/23  0338 09/14/23  0304 09/13/23  0416   WBC 10*3/mm3 8.37  8.78 7.31 8.55   HEMOGLOBIN g/dL 11.5*  11.3* 12.2* 12.5*   HEMATOCRIT % 34.0*  33.6* 36.0* 36.4*   PLATELETS 10*3/mm3 100*  126* 119* 143     Results from last 7 days   Lab Units 09/15/23  0338   SODIUM mmol/L 135*   POTASSIUM mmol/L 3.8   CHLORIDE mmol/L 101   CO2 mmol/L 26.0   BUN mg/dL 11   CREATININE mg/dL 0.95   GLUCOSE mg/dL 115*   CALCIUM mg/dL 7.8*     Results from last 7 days   Lab Units 09/15/23  0338   ALK PHOS U/L 69   BILIRUBIN mg/dL 0.8   ALT (SGPT) U/L 34   AST (SGOT) U/L 47*         Results from last 7 days   Lab Units 09/13/23  6791    CRP mg/dL 10.78*       Estimated Creatinine Clearance: 86.7 mL/min (by C-G formula based on SCr of 0.95 mg/dL).      Microbiology:  negative    Radiology:  Imaging Results (Last 72 Hours)       Procedure Component Value Units Date/Time    US Abdomen Complete [[id number]] Collected: 09/14/23 0802     Updated: 09/14/23 0809    Narrative:      US ABDOMEN COMPLETE    Date of Exam: 9/14/2023 7:22 AM EDT    Indication: fever unknown origin, vomiting and diarrhea.    Comparison: No comparisons available.    Technique: Routine gray scale and color Doppler imaging of the complete abdomen was performed according to routine protocol.      Findings:  The abdominal aorta is normal in size. The intrahepatic IVC is not well seen. The pancreas is normal in size and echogenicity. There is fatty infiltration of the liver. There is poor penetration of the sound beam. There are no focal liver lesions or bile   duct dilatation. There is no gallbladder dilatation, wall thickening, or cholelithiasis. There is a 2 mm polyp along the gallbladder wall. There is no pericholecystic fluid or edema. There are no renal lesions or hydronephrosis. The spleen is at the   upper limits of normal in size.      Impression:      Impression:  Borderline splenomegaly. Fatty liver.        Electronically Signed: Joy Salgado MD    9/14/2023 8:06 AM EDT    Workstation ID: [id number]    XR Chest 1 View [[id number]] Collected: 09/14/23 0714     Updated: 09/14/23 0718    Narrative:      XR CHEST 1 VW    Date of Exam: 9/14/2023 3:35 AM EDT    Indication: pneumonia    Comparison: 9/12/2023    Findings:  The heart size is normal. The pulmonary vascular pattern is normal. There is mild right basilar atelectasis. The lungs are otherwise clear.      Impression:      Impression:  Mild right basilar discoid atelectasis otherwise negative chest      Electronically Signed: Ruben Schultz MD    9/14/2023 7:15 AM EDT    Workstation ID: [id number]    XR Chest 1 View  [[id number]] Collected: 09/12/23 2113     Updated: 09/12/23 2117    Narrative:      XR CHEST 1 VW    Date of Exam: 9/12/2023 9:02 PM EDT    Indication: fever    Comparison: None available.    Findings:  Cardiomediastinal silhouette is within normal limits. Mild right hemidiaphragm elevation. No focal consolidation or overt pulmonary edema. No pleural effusion or pneumothorax. Osseous structures are intact.      Impression:      Impression:  No evidence of acute cardiopulmonary disease.    Electronically Signed: Desmond Solis MD    9/12/2023 9:14 PM EDT    Workstation ID: [id number]    CT Abdomen Pelvis With Contrast [[id number]] Collected: 09/12/23 1648     Updated: 09/12/23 1701    Narrative:      CT ABDOMEN PELVIS W CONTRAST    Date of Exam: 9/12/2023 4:37 PM EDT    Indication: N/V/D, weight loss.    Comparison: None available.    Technique: Axial CT images were obtained of the abdomen and pelvis following the uneventful intravenous administration of 85 mL Isovue-300. Reconstructed coronal and sagittal images were also obtained. Automated exposure control and iterative   construction methods were used.    Findings:    Lower thorax: Lung bases are clear.    Liver: No focal hepatic lesion on this single phase exam. Hepatic steatosis.    Gallbladder and bile ducts: Gallbladder is unremarkable. No biliary ductal dilatation.    Pancreas: No pancreatic duct dilation. No surrounding inflammation.    Spleen: Spleen is mildly enlarged measuring 14 cm.    Adrenal glands: No discrete adrenal nodule.    Kidneys: No hydronephrosis. No suspicious renal lesions.    Urinary bladder: Unremarkable.    Reproductive organs: Prostate calcifications.    Stomach and bowel: No evidence of bowel obstruction. Diverticulosis. Normal appendix.    Lymph nodes: No pathologically enlarged lymph nodes.    Vessels: No abdominal aortic aneurysm.    Peritoneum and retroperitoneum: No free air or free fluid.    Soft tissues: Unremarkable.    Osseous  structures: No suspicious osseous lesions.      Impression:      Impression:    Hepatic steatosis and mild splenomegaly.    Otherwise, no evidence of acute abnormality in the abdomen or pelvis.    Electronically Signed: Desmond Solis MD    9/12/2023 4:58 PM EDT    Workstation ID: [id number]            I personally reviewed the radiographic studies          Impression:         -- FUO of ~ 1 month duration in a patient with extensive outdoor exposure.   The absence of recent recognized tick bites does not r/o rickettsial or borrelia infections. If his illness has a single cause then RMSF or ehrlichia are less likely since these infections often result in a fulminant illness. Q fever or Lyme could have a more prolonged course although his symptoms do not fit the classic presentation.  His platelets are at the lower range of normal and are falling over 24 hours  Viral illness such as EBV, CMV etc are possible and could possibly account for his symptoms    CMV IgM negative    Other serologies pending    29% atypical lymphs on todays diff suggests viral infection but he will need a f/u Cbc in 1-2 weeks and possible hematology evaluation if the cbc is still abnormal     -- Nausea and vomiting- improved Lipase normal     -- Diarrhea- persists  GI panel and c diff negative     -- Thrombocytopenia   Could be drug related (zosyn) or secondary to infection    -- Persistent L shift with smudge cells on diff  Likely reactive but some concern re: leukemia     -- History of nephrolithiasis and prior urologic procedure  No hydronephrosis on 9/12 CT   Mild pyuria of unclear significance      PLAN/RECOMMENDATIONS:   Thank you for asking us to see Peter Faye, I recommend the following:     -- stop vanc since blood cultures remain negative     -- change zosyn to ceftriaxone since the former could potentially be exacerbating thrombocytopenia     -- empiric doxycycline     -- peripheral smear with path review for babesiosis and leukemia       -- serologies for zoonoses     -- serologies for viral pathogens      -- await blood and urine culture results    If he continues to improve consider discharge 9/15 on cefuroxime and doxycycline x 7 days    F/u with me ~ 10 days- this will probably be a TH visit     Eva Whiteside MD  9/15/2023

## 2023-09-15 NOTE — PLAN OF CARE
Goal Outcome Evaluation:  Plan of Care Reviewed With: patient      Progress: improving       Problem: Adult Inpatient Plan of Care  Goal: Absence of Hospital-Acquired Illness or Injury  Intervention: Identify and Manage Fall Risk  Recent Flowsheet Documentation  Taken 9/14/2023 2000 by Tarun Kang RN  Safety Promotion/Fall Prevention: activity supervised     Problem: Adult Inpatient Plan of Care  Goal: Absence of Hospital-Acquired Illness or Injury  Intervention: Prevent Skin Injury  Recent Flowsheet Documentation  Taken 9/14/2023 2000 by Tarun Kang RN  Body Position: position changed independently     Problem: Adult Inpatient Plan of Care  Goal: Absence of Hospital-Acquired Illness or Injury  Intervention: Prevent and Manage VTE (Venous Thromboembolism) Risk  Recent Flowsheet Documentation  Taken 9/14/2023 2000 by Tarun Kang RN  Activity Management:   ambulated in room   ambulated to bathroom

## 2023-09-16 LAB
CMV DNA SERPL NAA+PROBE-ACNC: NEGATIVE IU/ML
CMV DNA SERPL NAA+PROBE-LOG IU: NORMAL LOG10 IU/ML

## 2023-09-16 NOTE — OUTREACH NOTE
Prep Survey      Flowsheet Row Responses   [facility] facility patient discharged from? [place]   Is LACE score < 7 ? Yes   Eligibility [facility]   Date of Admission 09/12/23   Date of Discharge 09/15/23   Discharge Disposition Home or Self Care   Discharge diagnosis Sepsis   Does the patient have one of the following disease processes/diagnoses(primary or secondary)? Sepsis   Does the patient have Home health ordered? No   Is there a DME ordered? No   Comments regarding appointments new PCP appt   Prep survey completed? Yes            CASTILLO THOMAS - Registered Nurse

## 2023-09-17 LAB
BACTERIA SPEC AEROBE CULT: NORMAL
BACTERIA SPEC AEROBE CULT: NORMAL

## 2023-09-18 ENCOUNTER — TRANSITIONAL CARE MANAGEMENT TELEPHONE ENCOUNTER (OUTPATIENT)
Dept: CALL CENTER | Facility: HOSPITAL | Age: 68
End: 2023-09-18
Payer: MEDICARE

## 2023-09-18 LAB
ALBUMIN SERPL ELPH-MCNC: 2.4 G/DL (ref 2.9–4.4)
ALBUMIN/GLOB SERPL: 0.7 {RATIO} (ref 0.7–1.7)
ALPHA1 GLOB SERPL ELPH-MCNC: 0.3 G/DL (ref 0–0.4)
ALPHA2 GLOB SERPL ELPH-MCNC: 0.8 G/DL (ref 0.4–1)
B-GLOBULIN SERPL ELPH-MCNC: 0.7 G/DL (ref 0.7–1.3)
CREAT BLDA-MCNC: 1.2 MG/DL (ref 0.6–1.3)
GAMMA GLOB SERPL ELPH-MCNC: 1.7 G/DL (ref 0.4–1.8)
GLOBULIN SER CALC-MCNC: 3.5 G/DL (ref 2.2–3.9)
LABORATORY COMMENT REPORT: ABNORMAL
M PROTEIN SERPL ELPH-MCNC: ABNORMAL G/DL
PROT SERPL-MCNC: 5.9 G/DL (ref 6–8.5)

## 2023-09-18 NOTE — OUTREACH NOTE
Call Center TCM Note      Flowsheet Row Responses   [facility] patient discharged from? [place]   Does the patient have one of the following disease processes/diagnoses(primary or secondary)? Sepsis   TCM attempt successful? No   Unsuccessful attempts Attempt 1  [Pt is currently having cataract surgery and to call back later today]            Asiya Son LPN    9/18/2023, 13:04 EDT

## 2023-09-18 NOTE — OUTREACH NOTE
Call Center TCM Note      Flowsheet Row Responses   [facility] patient discharged from? [place]   Does the patient have one of the following disease processes/diagnoses(primary or secondary)? Sepsis   TCM attempt successful? Yes   Call start time 1629   Call end time 1632   Discharge diagnosis Sepsis   Meds reviewed with patient/caregiver? Yes   Is the patient having any side effects they believe may be caused by any medication additions or changes? No   Does the patient have all medications related to this admission filled (includes all antibiotics, inhalers, nebulizers,steroids,etc.) Yes   Is the patient taking all medications as directed (includes completed medication regime)? Yes   Comments Pt has new pt appt on 10/12 with Dr. Whitten.  This is out of the TCM time frame and will see if sooner appt is available.   Does the patient have an appointment with their PCP within 7-14 days of discharge? Other   Nursing Interventions Confirmed date/time of appointment, Routed TCM call to PCP office   Psychosocial issues? No   Did the patient receive a copy of their discharge instructions? Yes   Nursing interventions Reviewed instructions with patient   What is the patient's perception of their health status since discharge? Improving   Nursing interventions Nurse provided patient education   Is the patient/caregiver able to teach back TIME? T emperature - higher or lower than normal, I nfection - may have signs and symptoms of an infection, M ental Decline - confused, sleepy, difficult to arouse, E xtremely Ill - severe pain, discomfort, shortness of breath   Is patient/caregiver able to teach back steps to recovery at home? Set small, achievable goals for return to baseline health, Rest and regain strength, Eat a balanced diet, Make a list of questions for PCP appoinment   Is the patient/caregiver able to teach back signs and symptoms of worsening condition: Fever, Rapid heart rate (>90), Shortness of breath/rapid  respiratory rate, Altered mental status(confusion/coma)   If the patient is a current smoker, are they able to teach back resources for cessation? Not a smoker   Is the patient/caregiver able to teach back the hierarchy of who to call/visit for symptoms/problems? PCP, Specialist, Home health nurse, Urgent Care, ED, 911 Yes   TCM call completed? Yes   Call end time [id number]            Asiya Son LPN    9/18/2023, 16:33 EDT

## 2023-09-19 LAB
LEPTOSPIRA IGM SER QL IA: NORMAL
STRONGYLOIDES IGG SER QL IA: NEGATIVE

## 2023-09-21 LAB
RICK SF IGG TITR SER IF: NORMAL {TITER}
RICK SF IGM TITR SER IF: NORMAL {TITER}
RICK TYPHUS IGG TITR SER IF: NORMAL {TITER}
RICK TYPHUS IGM TITR SER IF: NORMAL {TITER}

## 2023-09-22 ENCOUNTER — LAB (OUTPATIENT)
Dept: LAB | Facility: HOSPITAL | Age: 68
End: 2023-09-22
Payer: MEDICARE

## 2023-09-22 ENCOUNTER — TRANSCRIBE ORDERS (OUTPATIENT)
Dept: LAB | Facility: HOSPITAL | Age: 68
End: 2023-09-22
Payer: MEDICARE

## 2023-09-22 DIAGNOSIS — D72.820 PERSISTENT LYMPHOCYTOSIS: Primary | ICD-10-CM

## 2023-09-22 LAB
BASOPHILS # BLD AUTO: 0.06 10*3/MM3 (ref 0–0.2)
BASOPHILS NFR BLD AUTO: 0.5 % (ref 0–1.5)
DEPRECATED RDW RBC AUTO: 44.5 FL (ref 37–54)
EOSINOPHIL # BLD AUTO: 0.08 10*3/MM3 (ref 0–0.4)
EOSINOPHIL NFR BLD AUTO: 0.7 % (ref 0.3–6.2)
ERYTHROCYTE [DISTWIDTH] IN BLOOD BY AUTOMATED COUNT: 13.5 % (ref 12.3–15.4)
HCT VFR BLD AUTO: 42.8 % (ref 37.5–51)
HGB BLD-MCNC: 13.9 G/DL (ref 13–17.7)
IMM GRANULOCYTES # BLD AUTO: 0.2 10*3/MM3 (ref 0–0.05)
IMM GRANULOCYTES NFR BLD AUTO: 1.7 % (ref 0–0.5)
LYMPHOCYTES # BLD AUTO: 2.45 10*3/MM3 (ref 0.7–3.1)
LYMPHOCYTES NFR BLD AUTO: 21.4 % (ref 19.6–45.3)
MCH RBC QN AUTO: 29.3 PG (ref 26.6–33)
MCHC RBC AUTO-ENTMCNC: 32.5 G/DL (ref 31.5–35.7)
MCV RBC AUTO: 90.1 FL (ref 79–97)
MONOCYTES # BLD AUTO: 0.82 10*3/MM3 (ref 0.1–0.9)
MONOCYTES NFR BLD AUTO: 7.2 % (ref 5–12)
NEUTROPHILS NFR BLD AUTO: 68.5 % (ref 42.7–76)
NEUTROPHILS NFR BLD AUTO: 7.83 10*3/MM3 (ref 1.7–7)
NRBC BLD AUTO-RTO: 0 /100 WBC (ref 0–0.2)
PLATELET # BLD AUTO: 347 10*3/MM3 (ref 140–450)
PMV BLD AUTO: 10.1 FL (ref 6–12)
RBC # BLD AUTO: 4.75 10*6/MM3 (ref 4.14–5.8)
WBC NRBC COR # BLD: 11.44 10*3/MM3 (ref 3.4–10.8)

## 2023-09-22 PROCEDURE — 85025 COMPLETE CBC W/AUTO DIFF WBC: CPT | Performed by: INTERNAL MEDICINE

## 2023-09-22 PROCEDURE — 36415 COLL VENOUS BLD VENIPUNCTURE: CPT | Performed by: INTERNAL MEDICINE

## 2023-09-25 LAB
C BURNET PH1 IGG SER QL IF: NEGATIVE
C BURNET PH1 IGM SER QL IF: NEGATIVE
C BURNET PH2 IGG SER QL IF: NEGATIVE
C BURNET PH2 IGM SER QL IF: NEGATIVE

## 2023-09-25 NOTE — PROGRESS NOTES
"Transitional Care Follow Up Visit  Subjective     Peter Faye is a 68 y.o. male who presents for a transitional care management visit.    Within 48 business hours after discharge our office contacted him via telephone to coordinate his care and needs.      I reviewed and discussed the details of that call along with the discharge summary, hospital problems, inpatient lab results, inpatient diagnostic studies, and consultation reports with Peter.     Current outpatient and discharge medications have been reconciled for the patient.  Reviewed by: Raghav Whitten MD          9/15/2023     9:45 PM   Date of TCM Phone Call   [facility]   Date of Admission 9/12/2023   Date of Discharge 9/15/2023   Discharge Disposition Home or Self Care     Risk for Readmission (LACE) Score: 6 (9/15/2023  6:00 AM)      History of Present Illness  The patient presents for hospital follow-up. He has agreed to utilize [facility].     An adult female presents during today's visit.     Previous providers.   The patient note he has not seen a physician in 10 years. The adult female states the patient previously saw Dr. Kwon.     MRSA.   The adult female notes the patient had a past MRSA infection after obtaining a splinter.     Type 2 DM.   The adult female states she has been monitoring the patient's blood glucose level. She notes it is between 80 mg/dL to 90 mg/dL in the morning. After he eats, she notes his blood glucose is between 140 mg/dL and 170 mg/dL.     Diet.   The adult female states the patient follows a gluten free diet due to recent celiac testing. The patient states he likes water, and attempts to drink this often. The patient denies tobacco or alcohol use.     Fatigue.   The adult female states the patient has been \"sick for 2 month\". She reports fatigue and decreased appetite.     Weakness.   The adult female states the patient experienced pain and weakness on 02/23/2023.     Hospital encounter.   The patient states he is " "feeling better since his hospital stay. The patient notes an upcoming appointment with Dr. Whiteside. He is no longer taking Abx.     GI.   The patient has a current referral for GI, but has not heard back from the office at this time. The adult female voices concerns regarding the patient's weight loss. The patient inquires if his weight loss will correlate to decrease in muscle mass.     Health maintenance.   The patient denies previously having a Medicare Wellness exam performed.      Course During Hospital Stay:    Patient was admitted to [facility] from 9/12/2023 to 9/15/2023.    Patient has past medical history of nephrolithiasis and type 2 diabetes mellitus.  He presented with nausea vomiting and diarrhea with associated fevers and chills.    Per discharge summary:  \"Fever of unknown origin  Sepsis, POA  --On admission had elevated procal, lactate, bandemia, fever  -UA seemed indicative of a UTI but not enough to explain the severity of symptoms and his urine culture has now returned negative  -GI PCR panel negative, C. difficile negative, blood cultures NGTD, COVID/Flu negative  -CT A/P showed hepatic steatosis and mild splenomegaly but otherwise no acute abnormality; abdominal ultrasound with fatty liver and borderline splenomegaly   -Given underlying suspicion for infection but without clear source, ID consulted; Dr. Whiteside concerned for tickborne illness due to hx of extensive outdoor exposure  --CMV positive IgG but negative IgM  --EBV, tickborne serologies pending  --Initially on Vanc/Zosyn, empiric Doxycycline added  --Now on Rocephin/Doxycycline  --Afebrile past 24 hours  --Dr. Whiteside recommends 7 day course of PO Ceftin and Doxycycline and F/U in her office in 10 days to go over all pending test results     Intractable nausea/diarrhea  Elevated transaminases, improved  Fatty liver  --Reportedly only able to tolerate popsicles and small amounts of food, has had weight loss  --So far, GI " "infectious etiology negative as above  --Negative acute hepatitis panel, CT A/P and abdominal US results as above  --GI following; Strongyloides Ab and celiac markers are still pending, as well as autoimmune hepatitis workup  --Significantly improved, ate almost his entire breakfast this morning  --GI will make referral to [facility] in 3-4 weeks     Thrombocytopenia  --Platelets trending down but now trending back up today     Lymphocytosis  --CBC with diff today showing moderate smudge cells and atypical lymphocytes  --Discussed with Dr. Whiteside, likely related to a viral infection, but will need followed up on with PCP as outpatient     DM2  --HbA1c is 8.0%  --New diagnosis  --Has not seen a physician in 10 years  --Discussed with patient and wife, plan at this time is to work on diet/lifestyle modification and F/U with PCP for further management, will not initiate any meds at this time     Hyponatremia  --Improved with fluids\"    At discharge EBV, tickborne serologies were pending.  -Recommended to complete 7-day course of Ceftin and doxycycline as well as follow-up with Dr. Whiteside of infectious diseases.  They report that he recently saw her on Friday, September 22, 2023 at which time was recommended that he stop his antibiotics.  They have a follow-up visit scheduled in the coming weeks.    -At discharge Strongyloides antibody and celiac markers pending as well as autoimmune titers work-up.  Recommended GI follow-up in 3 to 4 weeks with [facility].  He has yet to have appointment scheduled with gastroenterology.    Had new diagnosis of type II developed diabetes mellitus during admission.  A1c was 8.0%.    Plan for Follow-up of Pending Labs/Results: ID and GI to follow up  Pending Labs         Order Current Status     YAHAIRA Negative     Anti-Smooth Muscle Antibody Titer Elevated, 30, borderline moderate positive     CBC & Differential 9/22/23 elevated wbc to 11, otherwise normal     CBC Auto " "Differential In process     CMV DNA, Quantitative, PCR negative     EBV Antibody Profile Positive IgM, IgG and EBV nuclear antigen Ab     Moris-Barr Virus VCA, IgM Positive, 43.7, elevated     Leptospira Antibody IgM Non reactive     Manual Differential In process     Mitochondrial Antibodies, M2 Negative     Mycoplasma Pneu. IgG / IgM Antibodies Negative     Protein Electrophoresis, Total Low total protein and albumin, no M spike     Q Fever Antibodies IgG / IgM Negative     Rickettsial Fever Group IgG / M Negative     Strongyloides Antibody IgG, DANAY Negative     Tissue Transglutaminase, IgA Negative     Blood Culture - Blood, Arm, Right No growth 5 days     Blood Culture - Blood, Arm, Right No growth 5 days        The following portions of the patient's history were reviewed and updated as appropriate: allergies, current medications, past family history, past medical history, past social history, past surgical history, and problem list.    Review of Systems   Constitutional:  Positive for appetite change, fatigue and unexpected weight change.   Neurological:  Positive for weakness.     Objective   Vitals:    09/26/23 1033   BP: 130/88   BP Location: Left arm   Patient Position: Sitting   Cuff Size: Adult   Pulse: 77   Resp: 20   Temp: 97.8 °F (36.6 °C)   TempSrc: Temporal   Weight: 87.3 kg (192 lb 8 oz)   Height: 181 cm (71.26\")      Physical Exam  Vitals reviewed.   Constitutional:       General: He is not in acute distress.     Appearance: Normal appearance. He is not ill-appearing or toxic-appearing.   HENT:      Head: Normocephalic and atraumatic.      Right Ear: External ear normal.      Left Ear: External ear normal.      Nose: Nose normal. No congestion.      Mouth/Throat:      Mouth: Mucous membranes are moist.      Pharynx: No oropharyngeal exudate or posterior oropharyngeal erythema.   Eyes:      General: No scleral icterus.        Right eye: No discharge.         Left eye: No discharge.      " Extraocular Movements: Extraocular movements intact.      Pupils: Pupils are equal, round, and reactive to light.   Cardiovascular:      Rate and Rhythm: Normal rate and regular rhythm.      Pulses: Normal pulses.      Heart sounds: Normal heart sounds. No murmur heard.    No friction rub. No gallop.   Pulmonary:      Effort: Pulmonary effort is normal. No respiratory distress.      Breath sounds: Normal breath sounds. No wheezing, rhonchi or rales.   Abdominal:      General: Abdomen is flat. Bowel sounds are normal. There is no distension.      Palpations: Abdomen is soft. There is no mass.      Tenderness: There is no abdominal tenderness. There is no guarding or rebound.   Musculoskeletal:         General: No swelling or deformity. Normal range of motion.      Cervical back: Normal range of motion. No rigidity or tenderness.   Lymphadenopathy:      Cervical: No cervical adenopathy.   Skin:     General: Skin is warm and dry.      Capillary Refill: Capillary refill takes less than 2 seconds.      Coloration: Skin is not jaundiced or pale.   Neurological:      General: No focal deficit present.      Mental Status: He is alert and oriented to person, place, and time. Mental status is at baseline.   Psychiatric:         Mood and Affect: Mood normal.         Behavior: Behavior normal.         Judgment: Judgment normal.     Labs:  WBC   Date Value Ref Range Status   09/22/2023 11.44 (H) 3.40 - 10.80 10*3/mm3 Final     RBC   Date Value Ref Range Status   09/22/2023 4.75 4.14 - 5.80 10*6/mm3 Final     Hemoglobin   Date Value Ref Range Status   09/22/2023 13.9 13.0 - 17.7 g/dL Final     Hematocrit   Date Value Ref Range Status   09/22/2023 42.8 37.5 - 51.0 % Final     MCV   Date Value Ref Range Status   09/22/2023 90.1 79.0 - 97.0 fL Final     MCH   Date Value Ref Range Status   09/22/2023 29.3 26.6 - 33.0 pg Final     MCHC   Date Value Ref Range Status   09/22/2023 32.5 31.5 - 35.7 g/dL Final     RDW   Date Value Ref  Range Status   09/22/2023 13.5 12.3 - 15.4 % Final     RDW-SD   Date Value Ref Range Status   09/22/2023 44.5 37.0 - 54.0 fl Final     MPV   Date Value Ref Range Status   09/22/2023 10.1 6.0 - 12.0 fL Final     Platelets   Date Value Ref Range Status   09/22/2023 347 140 - 450 10*3/mm3 Final     Neutrophil %   Date Value Ref Range Status   09/22/2023 68.5 42.7 - 76.0 % Final     Lymphocyte %   Date Value Ref Range Status   09/22/2023 21.4 19.6 - 45.3 % Final     Monocyte %   Date Value Ref Range Status   09/22/2023 7.2 5.0 - 12.0 % Final     Eosinophil %   Date Value Ref Range Status   09/22/2023 0.7 0.3 - 6.2 % Final     Basophil %   Date Value Ref Range Status   09/22/2023 0.5 0.0 - 1.5 % Final     Immature Grans %   Date Value Ref Range Status   09/22/2023 1.7 (H) 0.0 - 0.5 % Final     Neutrophils, Absolute   Date Value Ref Range Status   09/22/2023 7.83 (H) 1.70 - 7.00 10*3/mm3 Final     Lymphocytes, Absolute   Date Value Ref Range Status   09/22/2023 2.45 0.70 - 3.10 10*3/mm3 Final     Monocytes, Absolute   Date Value Ref Range Status   09/22/2023 0.82 0.10 - 0.90 10*3/mm3 Final     Eosinophils, Absolute   Date Value Ref Range Status   09/22/2023 0.08 0.00 - 0.40 10*3/mm3 Final     Basophils, Absolute   Date Value Ref Range Status   09/22/2023 0.06 0.00 - 0.20 10*3/mm3 Final     Immature Grans, Absolute   Date Value Ref Range Status   09/22/2023 0.20 (H) 0.00 - 0.05 10*3/mm3 Final     nRBC   Date Value Ref Range Status   09/22/2023 0.0 0.0 - 0.2 /100 WBC Final         Assessment & Plan   Diagnoses and all orders for this visit:    1. Hospital discharge follow-up (Primary)  -     Comprehensive metabolic panel; Future    2. Thrombocytopenia  Assessment & Plan:  This is improving.  Recent CBC from 9/22 with resolution of thrombocytopenia, platelets 347.  We will repeat CBC in 1 week to monitor.  Likely secondary to borderline splenomegaly in setting of EBV infection.    Orders:  -     CBC & Differential;  Future    3. Atypical lymphocytosis  Assessment & Plan:  Noted on peripheral blood smear while hospitalized recently.  Likely secondary to EBV infection.  We will repeat peripheral smear in 1 week to follow-up.    Orders:  -     Peripheral Blood Smear; Future    4. EBV infection  Assessment & Plan:  Counseled patient on EBV infection the constellation of symptoms including splenomegaly, thrombocytopenia, fatigue.  Counseled patient on continued gentle physical activity and healthy diet      5. Hyponatremia    6. Type 2 diabetes mellitus without complication, without long-term current use of insulin  Assessment & Plan:  Diabetes is newly identified.   Dietary recommendations for ADA diet.  Regular aerobic exercise.  Diabetes will be reassessed  at the next appointment. .  Patient prefers to avoid medication at this time.  We discussed low carbohydrate diet and exercise to improve glycemic control.    Orders:  -     Microalbumin / Creatinine Urine Ratio - Urine, Clean Catch; Future    7. Screening for ischemic heart disease  -     Lipid panel; Future    8. Screening PSA (prostate specific antigen)  -     PSA SCREENING; Future    - We will obtain records from Dr. Kwon. Counseled on healthy diet and to remain active. Patient counseled on EBV positive testing. Patient counseled on recent testing results. Counseled patient on spleen enlargement and avoidance of contact sports or anything that could increase her risk of trauma to the abdomen. I will place an order for labs. Patient will return in 2 months for Medicare Wellness.     Transcribed from ambient dictation for Raghav Whitten MD by Jenn Fernandez.  09/26/23   11:42 EDT    Patient or patient representative verbalized consent to the visit recording.  I have personally performed the services described in this document as transcribed by the above individual, and it is both accurate and complete.

## 2023-09-26 ENCOUNTER — OFFICE VISIT (OUTPATIENT)
Dept: INTERNAL MEDICINE | Facility: CLINIC | Age: 68
End: 2023-09-26
Payer: MEDICARE

## 2023-09-26 VITALS
BODY MASS INDEX: 26.95 KG/M2 | SYSTOLIC BLOOD PRESSURE: 122 MMHG | HEART RATE: 77 BPM | RESPIRATION RATE: 20 BRPM | HEIGHT: 71 IN | WEIGHT: 192.5 LBS | DIASTOLIC BLOOD PRESSURE: 82 MMHG | TEMPERATURE: 97.8 F

## 2023-09-26 DIAGNOSIS — D69.6 THROMBOCYTOPENIA: ICD-10-CM

## 2023-09-26 DIAGNOSIS — Z12.5 SCREENING PSA (PROSTATE SPECIFIC ANTIGEN): ICD-10-CM

## 2023-09-26 DIAGNOSIS — E87.1 HYPONATREMIA: ICD-10-CM

## 2023-09-26 DIAGNOSIS — E11.9 TYPE 2 DIABETES MELLITUS WITHOUT COMPLICATION, WITHOUT LONG-TERM CURRENT USE OF INSULIN: ICD-10-CM

## 2023-09-26 DIAGNOSIS — B27.90 EBV INFECTION: ICD-10-CM

## 2023-09-26 DIAGNOSIS — D72.820 ATYPICAL LYMPHOCYTOSIS: ICD-10-CM

## 2023-09-26 DIAGNOSIS — Z13.6 SCREENING FOR ISCHEMIC HEART DISEASE: ICD-10-CM

## 2023-09-26 DIAGNOSIS — Z09 HOSPITAL DISCHARGE FOLLOW-UP: Primary | ICD-10-CM

## 2023-09-26 NOTE — ASSESSMENT & PLAN NOTE
Counseled patient on EBV infection the constellation of symptoms including splenomegaly, thrombocytopenia, fatigue.  Counseled patient on continued gentle physical activity and healthy diet

## 2023-09-26 NOTE — ASSESSMENT & PLAN NOTE
Noted on peripheral blood smear while hospitalized recently.  Likely secondary to EBV infection.  We will repeat peripheral smear in 1 week to follow-up.

## 2023-09-26 NOTE — ASSESSMENT & PLAN NOTE
This is improving.  Recent CBC from 9/22 with resolution of thrombocytopenia, platelets 347.  We will repeat CBC in 1 week to monitor.  Likely secondary to borderline splenomegaly in setting of EBV infection.

## 2023-09-26 NOTE — PATIENT INSTRUCTIONS
Health Maintenance, Male  A healthy lifestyle and preventive care is important for your health and wellness. Ask your health care provider about what schedule of regular examinations is right for you.  What should I know about weight and diet?    Eat a Healthy Diet  Eat plenty of vegetables, fruits, whole grains, low-fat dairy products, and lean protein.  Do not eat a lot of foods high in solid fats, added sugars, or salt.     Maintain a Healthy Weight  Regular exercise can help you achieve or maintain a healthy weight. You should:  Do at least 150 minutes of exercise each week. The exercise should increase your heart rate and make you sweat (moderate-intensity exercise).  Do strength-training exercises at least twice a week.     Watch Your Levels of Cholesterol and Blood Lipids  Have your blood tested for lipids and cholesterol every 5 years starting at 35 years of age. If you are at high risk for heart disease, you should start having your blood tested when you are 20 years old. You may need to have your cholesterol levels checked more often if:  Your lipid or cholesterol levels are high.  You are older than 50 years of age.  You are at high risk for heart disease.     What should I know about cancer screening?  Many types of cancers can be detected early and may often be prevented.  Lung Cancer  You should be screened every year for lung cancer if:  You are a current smoker who has smoked for at least 30 years.  You are a former smoker who has quit within the past 15 years.  Talk to your health care provider about your screening options, when you should start screening, and how often you should be screened.     Colorectal Cancer  Routine colorectal cancer screening usually begins at 50 years of age and should be repeated every 5-10 years until you are 75 years old. You may need to be screened more often if early forms of precancerous polyps or small growths are found. Your health care provider may recommend  screening at an earlier age if you have risk factors for colon cancer.  Your health care provider may recommend using home test kits to check for hidden blood in the stool.  A small camera at the end of a tube can be used to examine your colon (sigmoidoscopy or colonoscopy). This checks for the earliest forms of colorectal cancer.     Prostate and Testicular Cancer  Depending on your age and overall health, your health care provider may do certain tests to screen for prostate and testicular cancer.  Talk to your health care provider about any symptoms or concerns you have about testicular or prostate cancer.     Skin Cancer  Check your skin from head to toe regularly.  Tell your health care provider about any new moles or changes in moles, especially if:  There is a change in a mole’s size, shape, or color.  You have a mole that is larger than a pencil eraser.  Always use sunscreen. Apply sunscreen liberally and repeat throughout the day.  Protect yourself by wearing long sleeves, pants, a wide-brimmed hat, and sunglasses when outside.     What should I know about heart disease, diabetes, and high blood pressure?  If you are 18-39 years of age, have your blood pressure checked every 3-5 years. If you are 40 years of age or older, have your blood pressure checked every year. You should have your blood pressure measured twice--once when you are at a hospital or clinic, and once when you are not at a hospital or clinic. Record the average of the two measurements. To check your blood pressure when you are not at a hospital or clinic, you can use:  An automated blood pressure machine at a pharmacy.  A home blood pressure monitor.  Talk to your health care provider about your target blood pressure.  If you are between 45-79 years old, ask your health care provider if you should take aspirin to prevent heart disease.  Have regular diabetes screenings by checking your fasting blood sugar level.  If you are at a normal  weight and have a low risk for diabetes, have this test once every three years after the age of 45.  If you are overweight and have a high risk for diabetes, consider being tested at a younger age or more often.  A one-time screening for abdominal aortic aneurysm (AAA) by ultrasound is recommended for men aged 65-75 years who are current or former smokers.  What should I know about preventing infection?  Hepatitis B  If you have a higher risk for hepatitis B, you should be screened for this virus. Talk with your health care provider to find out if you are at risk for hepatitis B infection.  Hepatitis C  Blood testing is recommended for:  Everyone born from 1945 through 1965.  Anyone with known risk factors for hepatitis C.     Sexually Transmitted Diseases (STDs)  You should be screened each year for STDs including gonorrhea and chlamydia if:  You are sexually active and are younger than 24 years of age.  You are older than 24 years of age and your health care provider tells you that you are at risk for this type of infection.  Your sexual activity has changed since you were last screened and you are at an increased risk for chlamydia or gonorrhea. Ask your health care provider if you are at risk.  Talk with your health care provider about whether you are at high risk of being infected with HIV. Your health care provider may recommend a prescription medicine to help prevent HIV infection.     What else can I do?    Schedule regular health, dental, and eye exams.  Stay current with your vaccines (immunizations).  Do not use any tobacco products, such as cigarettes, chewing tobacco, and e-cigarettes. If you need help quitting, ask your health care provider.  Limit alcohol intake to no more than 2 drinks per day. One drink equals 12 ounces of beer, 5 ounces of wine, or 1½ ounces of hard liquor.  Do not use street drugs.  Do not share needles.  Ask your health care provider for help if you need support or information  about quitting drugs.  Tell your health care provider if you often feel depressed.  Tell your health care provider if you have ever been abused or do not feel safe at home.      This information is not intended to replace advice given to you by your health care provider. Make sure you discuss any questions you have with your health care provider.  Document Released: 06/15/2009 Document Revised: 08/16/2017 Document Reviewed: 09/20/2016  [employer] Interactive Patient Education © 2018 [employer]    Healthy Eating  Following a healthy eating pattern may help you to achieve and maintain a healthy body weight, reduce the risk of chronic disease, and live a long and productive life. It is important to follow a healthy eating pattern at an appropriate calorie level for your body. Your nutritional needs should be met primarily through food by choosing a variety of nutrient-rich foods.  What are tips for following this plan?  Reading food labels  Read labels and choose the following:  Reduced or low sodium.  Juices with 100% fruit juice.  Foods with low saturated fats and high polyunsaturated and monounsaturated fats.  Foods with whole grains, such as whole wheat, cracked wheat, brown rice, and wild rice.  Whole grains that are fortified with folic acid. This is recommended for women who are pregnant or who want to become pregnant.  Read labels and avoid the following:  Foods with a lot of added sugars. These include foods that contain brown sugar, corn sweetener, corn syrup, dextrose, fructose, glucose, high-fructose corn syrup, honey, invert sugar, lactose, malt syrup, maltose, molasses, raw sugar, sucrose, trehalose, or turbinado sugar.  Do not eat more than the following amounts of added sugar per day:  6 teaspoons (25 g) for women.  9 teaspoons (38 g) for men.  Foods that contain processed or refined starches and grains.  Refined grain products, such as white flour, degermed cornmeal, white bread, and white  "rice.  Shopping  Choose nutrient-rich snacks, such as vegetables, whole fruits, and nuts. Avoid high-calorie and high-sugar snacks, such as potato chips, fruit snacks, and candy.  Use oil-based dressings and spreads on foods instead of solid fats such as butter, stick margarine, or cream cheese.  Limit pre-made sauces, mixes, and \"instant\" products such as flavored rice, instant noodles, and ready-made pasta.  Try more plant-protein sources, such as tofu, tempeh, black beans, edamame, lentils, nuts, and seeds.  Explore eating plans such as the Mediterranean diet or vegetarian diet.  Cooking  Use oil to sauté or stir-george foods instead of solid fats such as butter, stick margarine, or lard.  Try baking, boiling, grilling, or broiling instead of frying.  Remove the fatty part of meats before cooking.  Steam vegetables in water or broth.  Meal planning    At meals, imagine dividing your plate into fourths:  One-half of your plate is fruits and vegetables.  One-fourth of your plate is whole grains.  One-fourth of your plate is protein, especially lean meats, poultry, eggs, tofu, beans, or nuts.  Include low-fat dairy as part of your daily diet.     Lifestyle  Choose healthy options in all settings, including home, work, school, restaurants, or stores.  Prepare your food safely:  Wash your hands after handling raw meats.  Keep food preparation surfaces clean by regularly washing with hot, soapy water.  Keep raw meats separate from ready-to-eat foods, such as fruits and vegetables.  , meat, poultry, and eggs to the recommended internal temperature.  Store foods at safe temperatures. In general:  Keep cold foods at 40°F (4.4°C) or below.  Keep hot foods at 140°F (60°C) or above.  Keep your freezer at 0°F (-17.8°C) or below.  Foods are no longer safe to eat when they have been between the temperatures of 40°-140°F (4.4-60°C) for more than 2 hours.  What foods should I eat?  Fruits  Aim to eat 2 cup-equivalents of " fresh, canned (in natural juice), or frozen fruits each day. Examples of 1 cup-equivalent of fruit include 1 small apple, 8 large strawberries, 1 cup canned fruit, ½ cup dried fruit, or 1 cup 100% juice.  Vegetables  Aim to eat 2½-3 cup-equivalents of fresh and frozen vegetables each day, including different varieties and colors. Examples of 1 cup-equivalent of vegetables include 2 medium carrots, 2 cups raw, leafy greens, 1 cup chopped vegetable (raw or cooked), or 1 medium baked potato.  Grains  Aim to eat 6 ounce-equivalents of whole grains each day. Examples of 1 ounce-equivalent of grains include 1 slice of bread, 1 cup ready-to-eat cereal, 3 cups popcorn, or ½ cup cooked rice, pasta, or cereal.  Meats and other proteins  Aim to eat 5-6 ounce-equivalents of protein each day. Examples of 1 ounce-equivalent of protein include 1 egg, 1/2 cup nuts or seeds, or 1 tablespoon (16 g) peanut butter. A cut of meat or fish that is the size of a deck of cards is about 3-4 ounce-equivalents.  Of the protein you eat each week, try to have at least 8 ounces come from seafood. This includes salmon, trout, herring, and anchovies.  Dairy  Aim to eat 3 cup-equivalents of fat-free or low-fat dairy each day. Examples of 1 cup-equivalent of dairy include 1 cup (240 mL) milk, 8 ounces (250 g) yogurt, 1½ ounces (44 g) natural cheese, or 1 cup (240 mL) fortified soy milk.  Fats and oils  Aim for about 5 teaspoons (21 g) per day. Choose monounsaturated fats, such as canola and olive oils, avocados, peanut butter, and most nuts, or polyunsaturated fats, such as sunflower, corn, and soybean oils, walnuts, pine nuts, sesame seeds, sunflower seeds, and flaxseed.  Beverages  Aim for six 8-oz glasses of water per day. Limit coffee to three to five 8-oz cups per day.  Limit caffeinated beverages that have added calories, such as soda and energy drinks.  Limit alcohol intake to no more than 1 drink a day for nonpregnant women and 2 drinks a day  for men. One drink equals 12 oz of beer (355 mL), 5 oz of wine (148 mL), or 1½ oz of hard liquor (44 mL).  Seasoning and other foods  Avoid adding excess amounts of salt to your foods. Try flavoring foods with herbs and spices instead of salt.  Avoid adding sugar to foods.  Try using oil-based dressings, sauces, and spreads instead of solid fats.  This information is based on general [place] nutrition guidelines. For more information, visit choosemyplate.gov. Exact amounts may vary based on your nutrition needs.  Summary  A healthy eating plan may help you to maintain a healthy weight, reduce the risk of chronic diseases, and stay active throughout your life.  Plan your meals. Make sure you eat the right portions of a variety of nutrient-rich foods.  Try baking, boiling, grilling, or broiling instead of frying.  Choose healthy options in all settings, including home, work, school, restaurants, or stores.  This information is not intended to replace advice given to you by your health care provider. Make sure you discuss any questions you have with your health care provider.  Document Revised: 04/01/2019 Document Reviewed: 04/01/2019  Elsevier Patient Education © 2021 [employer]

## 2023-09-26 NOTE — ASSESSMENT & PLAN NOTE
Diabetes is newly identified.   Dietary recommendations for ADA diet.  Regular aerobic exercise.  Diabetes will be reassessed  at the next appointment. .  Patient prefers to avoid medication at this time.  We discussed low carbohydrate diet and exercise to improve glycemic control.

## 2023-10-17 ENCOUNTER — PATIENT MESSAGE (OUTPATIENT)
Dept: INTERNAL MEDICINE | Facility: CLINIC | Age: 68
End: 2023-10-17
Payer: MEDICARE

## 2023-10-17 LAB
ALBUMIN/CREAT UR: 49 MG/G CREAT (ref 0–29)
AMBIG ABBREV LP DEFAULT: NORMAL
BASOPHILS # BLD AUTO: 0.1 X10E3/UL (ref 0–0.2)
BASOPHILS NFR BLD AUTO: 1 %
CHOLEST SERPL-MCNC: 254 MG/DL (ref 100–199)
CREAT UR-MCNC: 144 MG/DL
EOSINOPHIL # BLD AUTO: 0.3 X10E3/UL (ref 0–0.4)
EOSINOPHIL NFR BLD AUTO: 5 %
ERYTHROCYTE [DISTWIDTH] IN BLOOD BY AUTOMATED COUNT: 13.3 % (ref 11.6–15.4)
HCT VFR BLD AUTO: 43.8 % (ref 37.5–51)
HDLC SERPL-MCNC: 46 MG/DL
HGB BLD-MCNC: 14.5 G/DL (ref 13–17.7)
IMM GRANULOCYTES # BLD AUTO: 0 X10E3/UL (ref 0–0.1)
IMM GRANULOCYTES NFR BLD AUTO: 0 %
LDLC SERPL CALC-MCNC: 167 MG/DL (ref 0–99)
LYMPHOCYTES # BLD AUTO: 1.9 X10E3/UL (ref 0.7–3.1)
LYMPHOCYTES NFR BLD AUTO: 29 %
MCH RBC QN AUTO: 29.7 PG (ref 26.6–33)
MCHC RBC AUTO-ENTMCNC: 33.1 G/DL (ref 31.5–35.7)
MCV RBC AUTO: 90 FL (ref 79–97)
MICROALBUMIN UR-MCNC: 70.8 UG/ML
MONOCYTES # BLD AUTO: 0.4 X10E3/UL (ref 0.1–0.9)
MONOCYTES NFR BLD AUTO: 7 %
NEUTROPHILS # BLD AUTO: 3.9 X10E3/UL (ref 1.4–7)
NEUTROPHILS NFR BLD AUTO: 58 %
PATH INTERP BLD-IMP: NORMAL
PATH REV BLD -IMP: NORMAL
PATHOLOGIST NAME: NORMAL
PLATELET # BLD AUTO: 227 X10E3/UL (ref 150–450)
PSA SERPL-MCNC: 1.1 NG/ML (ref 0–4)
RBC # BLD AUTO: 4.88 X10E6/UL (ref 4.14–5.8)
TRIGL SERPL-MCNC: 221 MG/DL (ref 0–149)
VLDLC SERPL CALC-MCNC: 41 MG/DL (ref 5–40)
WBC # BLD AUTO: 6.7 X10E3/UL (ref 3.4–10.8)

## 2023-10-17 NOTE — TELEPHONE ENCOUNTER
From: Peter Faye  To: Raghav Whitten  Sent: 10/17/2023 1:22 PM EDT  Subject: Lab Purvi Test    Good afternoon Dr Davis,  Thank you taking Bruno on as a patient. You are great!    We wanted to let you know he did the blood work you requested on October 13th.   Breanna Loja has his date of birth as 1955 for medicare billing and said you would need to use that date to see results.    When you get those we would like to talk to you regarding other issuses and see if we need to schedule an appointment before November.  Thanks, Bruno

## 2023-10-23 ENCOUNTER — TELEPHONE (OUTPATIENT)
Dept: INTERNAL MEDICINE | Facility: CLINIC | Age: 68
End: 2023-10-23
Payer: MEDICARE

## 2023-10-23 ENCOUNTER — PATIENT MESSAGE (OUTPATIENT)
Dept: INTERNAL MEDICINE | Facility: CLINIC | Age: 68
End: 2023-10-23
Payer: MEDICARE

## 2023-10-23 DIAGNOSIS — R25.1 TREMOR: Primary | ICD-10-CM

## 2023-10-23 DIAGNOSIS — H91.90 HEARING LOSS, UNSPECIFIED HEARING LOSS TYPE, UNSPECIFIED LATERALITY: Primary | ICD-10-CM

## 2023-10-23 NOTE — TELEPHONE ENCOUNTER
Caller: Grace Faye    Relationship: Emergency Contact    Best call back number: [phone]    What specialty or service is being requested:     AUDIOLOGIST    What is the provider, practice or medical service name: [facility]    What is the office location: [place]     What is the office phone number:     [phone]    FAX - [phone]    Any additional details:

## 2023-10-23 NOTE — TELEPHONE ENCOUNTER
From: Peter Faye  To: Raghav Whitten  Sent: 10/23/2023 10:10 AM EDT  Subject: Neurology    Good morning Dr. Davis,  Hope you had a good weekend.  Bruno is still having issues with balance, tremors and memory. Would it be possible for you to make a referral to a neurologist? Maybe we could get a scan and see if something is going on in his brain?  I saw you have a neurologist close to you, Dr Peter Butts, II. We would prefer to schedule with him if possible.  I hate to be over paranoid but he is getting worse, by that I mean one example is he is having trouble holding his fork steady to eat.  Thanks, Bruno & Liliana

## 2023-10-23 NOTE — TELEPHONE ENCOUNTER
I have called Grace and let her know that his referral was placed as requested. She demonstrates appreciation and understanding.

## 2023-10-24 ENCOUNTER — OFFICE VISIT (OUTPATIENT)
Dept: INTERNAL MEDICINE | Facility: CLINIC | Age: 68
End: 2023-10-24
Payer: MEDICARE

## 2023-10-24 VITALS
DIASTOLIC BLOOD PRESSURE: 78 MMHG | TEMPERATURE: 97.5 F | BODY MASS INDEX: 27.44 KG/M2 | HEART RATE: 78 BPM | RESPIRATION RATE: 18 BRPM | SYSTOLIC BLOOD PRESSURE: 130 MMHG | WEIGHT: 198.2 LBS

## 2023-10-24 DIAGNOSIS — G31.84 MILD COGNITIVE IMPAIRMENT: Primary | ICD-10-CM

## 2023-10-24 DIAGNOSIS — E11.9 TYPE 2 DIABETES MELLITUS WITHOUT COMPLICATION, WITHOUT LONG-TERM CURRENT USE OF INSULIN: ICD-10-CM

## 2023-10-24 DIAGNOSIS — Z12.5 SCREENING PSA (PROSTATE SPECIFIC ANTIGEN): ICD-10-CM

## 2023-10-24 DIAGNOSIS — D72.820 ATYPICAL LYMPHOCYTOSIS: ICD-10-CM

## 2023-10-24 DIAGNOSIS — E83.51 HYPOCALCEMIA: ICD-10-CM

## 2023-10-24 DIAGNOSIS — Z13.6 SCREENING FOR ISCHEMIC HEART DISEASE: ICD-10-CM

## 2023-10-24 LAB
ALBUMIN UR-MCNC: 23.5 MG/DL
BASOPHILS # BLD AUTO: 0.08 10*3/MM3 (ref 0–0.2)
BASOPHILS NFR BLD AUTO: 1.2 % (ref 0–1.5)
CREAT UR-MCNC: 227.3 MG/DL
DEPRECATED RDW RBC AUTO: 43.3 FL (ref 37–54)
EOSINOPHIL # BLD AUTO: 0.22 10*3/MM3 (ref 0–0.4)
EOSINOPHIL NFR BLD AUTO: 3.2 % (ref 0.3–6.2)
ERYTHROCYTE [DISTWIDTH] IN BLOOD BY AUTOMATED COUNT: 13.6 % (ref 12.3–15.4)
HCT VFR BLD AUTO: 44.1 % (ref 37.5–51)
HGB BLD-MCNC: 14.9 G/DL (ref 13–17.7)
IMM GRANULOCYTES # BLD AUTO: 0.01 10*3/MM3 (ref 0–0.05)
IMM GRANULOCYTES NFR BLD AUTO: 0.1 % (ref 0–0.5)
LYMPHOCYTES # BLD AUTO: 1.8 10*3/MM3 (ref 0.7–3.1)
LYMPHOCYTES NFR BLD AUTO: 26.4 % (ref 19.6–45.3)
MCH RBC QN AUTO: 29.9 PG (ref 26.6–33)
MCHC RBC AUTO-ENTMCNC: 33.8 G/DL (ref 31.5–35.7)
MCV RBC AUTO: 88.6 FL (ref 79–97)
MICROALBUMIN/CREAT UR: 103.4 MG/G (ref 0–29)
MONOCYTES # BLD AUTO: 0.42 10*3/MM3 (ref 0.1–0.9)
MONOCYTES NFR BLD AUTO: 6.1 % (ref 5–12)
NEUTROPHILS NFR BLD AUTO: 4.3 10*3/MM3 (ref 1.7–7)
NEUTROPHILS NFR BLD AUTO: 63 % (ref 42.7–76)
NRBC BLD AUTO-RTO: 0 /100 WBC (ref 0–0.2)
PATHOLOGY REVIEW: YES
PLATELET # BLD AUTO: 224 10*3/MM3 (ref 140–450)
PMV BLD AUTO: 9.7 FL (ref 6–12)
RBC # BLD AUTO: 4.98 10*6/MM3 (ref 4.14–5.8)
WBC NRBC COR # BLD: 6.83 10*3/MM3 (ref 3.4–10.8)

## 2023-10-24 PROCEDURE — 82306 VITAMIN D 25 HYDROXY: CPT | Performed by: STUDENT IN AN ORGANIZED HEALTH CARE EDUCATION/TRAINING PROGRAM

## 2023-10-24 PROCEDURE — 82570 ASSAY OF URINE CREATININE: CPT | Performed by: STUDENT IN AN ORGANIZED HEALTH CARE EDUCATION/TRAINING PROGRAM

## 2023-10-24 PROCEDURE — 85025 COMPLETE CBC W/AUTO DIFF WBC: CPT | Performed by: STUDENT IN AN ORGANIZED HEALTH CARE EDUCATION/TRAINING PROGRAM

## 2023-10-24 PROCEDURE — 82043 UR ALBUMIN QUANTITATIVE: CPT | Performed by: STUDENT IN AN ORGANIZED HEALTH CARE EDUCATION/TRAINING PROGRAM

## 2023-10-24 PROCEDURE — 82746 ASSAY OF FOLIC ACID SERUM: CPT | Performed by: STUDENT IN AN ORGANIZED HEALTH CARE EDUCATION/TRAINING PROGRAM

## 2023-10-24 PROCEDURE — G0103 PSA SCREENING: HCPCS | Performed by: STUDENT IN AN ORGANIZED HEALTH CARE EDUCATION/TRAINING PROGRAM

## 2023-10-24 PROCEDURE — 80061 LIPID PANEL: CPT | Performed by: STUDENT IN AN ORGANIZED HEALTH CARE EDUCATION/TRAINING PROGRAM

## 2023-10-24 PROCEDURE — 80053 COMPREHEN METABOLIC PANEL: CPT | Performed by: STUDENT IN AN ORGANIZED HEALTH CARE EDUCATION/TRAINING PROGRAM

## 2023-10-24 PROCEDURE — 84443 ASSAY THYROID STIM HORMONE: CPT | Performed by: STUDENT IN AN ORGANIZED HEALTH CARE EDUCATION/TRAINING PROGRAM

## 2023-10-24 PROCEDURE — 82607 VITAMIN B-12: CPT | Performed by: STUDENT IN AN ORGANIZED HEALTH CARE EDUCATION/TRAINING PROGRAM
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
## 2023-10-25 ENCOUNTER — TELEPHONE (OUTPATIENT)
Dept: INTERNAL MEDICINE | Facility: CLINIC | Age: 68
End: 2023-10-25
Payer: MEDICARE

## 2023-10-25 DIAGNOSIS — E55.9 VITAMIN D DEFICIENCY: Primary | ICD-10-CM

## 2023-10-25 PROBLEM — R80.9 MICROALBUMINURIA: Status: ACTIVE | Noted: 2023-10-25

## 2023-10-25 LAB
25(OH)D3 SERPL-MCNC: 26.3 NG/ML (ref 30–100)
ALBUMIN SERPL-MCNC: 4.3 G/DL (ref 3.5–5.2)
ALBUMIN/GLOB SERPL: 1.1 G/DL
ALP SERPL-CCNC: 76 U/L (ref 39–117)
ALT SERPL W P-5'-P-CCNC: 51 U/L (ref 1–41)
ANION GAP SERPL CALCULATED.3IONS-SCNC: 13.4 MMOL/L (ref 5–15)
AST SERPL-CCNC: 44 U/L (ref 1–40)
BILIRUB SERPL-MCNC: 0.5 MG/DL (ref 0–1.2)
BUN SERPL-MCNC: 12 MG/DL (ref 8–23)
BUN/CREAT SERPL: 12.6 (ref 7–25)
CALCIUM SPEC-SCNC: 9.8 MG/DL (ref 8.6–10.5)
CHLORIDE SERPL-SCNC: 100 MMOL/L (ref 98–107)
CHOLEST SERPL-MCNC: 222 MG/DL (ref 0–200)
CO2 SERPL-SCNC: 23.6 MMOL/L (ref 22–29)
CREAT SERPL-MCNC: 0.95 MG/DL (ref 0.76–1.27)
CYTO UR: NORMAL
EGFRCR SERPLBLD CKD-EPI 2021: 87.2 ML/MIN/1.73
FOLATE SERPL-MCNC: 6.94 NG/ML (ref 4.78–24.2)
GLOBULIN UR ELPH-MCNC: 3.8 GM/DL
GLUCOSE SERPL-MCNC: 190 MG/DL (ref 65–99)
HDLC SERPL-MCNC: 38 MG/DL (ref 40–60)
LAB AP CASE REPORT: NORMAL
LDLC SERPL CALC-MCNC: 116 MG/DL (ref 0–100)
LDLC/HDLC SERPL: 2.78 {RATIO}
PATH REPORT.FINAL DX SPEC: NORMAL
POTASSIUM SERPL-SCNC: 4.3 MMOL/L (ref 3.5–5.2)
PROT SERPL-MCNC: 8.1 G/DL (ref 6–8.5)
PSA SERPL-MCNC: 0.85 NG/ML (ref 0–4)
SODIUM SERPL-SCNC: 137 MMOL/L (ref 136–145)
TRIGL SERPL-MCNC: 391 MG/DL (ref 0–150)
TSH SERPL DL<=0.05 MIU/L-ACNC: 2.35 UIU/ML (ref 0.27–4.2)
VIT B12 BLD-MCNC: 444 PG/ML (ref 211–946)
VLDLC SERPL-MCNC: 68 MG/DL (ref 5–40)

## 2023-10-25 RX ORDER — ERGOCALCIFEROL 1.25 MG/1
50000 CAPSULE ORAL WEEKLY
Qty: 5 CAPSULE | Refills: 3 | Status: SHIPPED | OUTPATIENT
Start: 2023-10-25

## 2023-10-25 NOTE — TELEPHONE ENCOUNTER
----- Message from Raghav Whitten MD sent at 10/25/2023  8:13 AM EDT -----  Cholesterol improving (expect triglycerides to be high after eating). Mild elevation of liver enzymes, stable. We'll continue to monitor. Kidney function and electrolytes normal. Low vitamin D noted, I'm sending in a once weekly supplement to be taken for this (this can affect memory). Thyroid function normal. B vitamins normal.  Please continue your care as discussed at your last visit.      Best,  Dr. Whitten

## 2023-10-25 NOTE — TELEPHONE ENCOUNTER
Tried to reach patient no answer left voicemail to return call    RELAY:  Cholesterol improving (expect triglycerides to be high after eating). Mild elevation of liver enzymes, stable. We'll continue to monitor. Kidney function and electrolytes normal. Low vitamin D noted, I'm sending in a once weekly supplement to be taken for this (this can affect memory). Thyroid function normal. B vitamins normal.  Please continue your care as discussed at your last visit.

## 2023-10-25 NOTE — TELEPHONE ENCOUNTER
"    Name: Peter Faye \"Bruno\"    Relationship: Self    Best Callback Number:[phone]    HUB PROVIDED THE RELAY MESSAGE FROM THE OFFICE   PATIENT VOICED UNDERSTANDING AND HAS NO FURTHER QUESTIONS AT THIS TIME    "

## 2023-10-25 NOTE — PROGRESS NOTES
Cholesterol improving (expect triglycerides to be high after eating). Mild elevation of liver enzymes, stable. We'll continue to monitor. Kidney function and electrolytes normal. Low vitamin D noted, I'm sending in a once weekly supplement to be taken for this (this can affect memory). Thyroid function normal. B vitamins normal.  Please continue your care as discussed at your last visit.      Best,  Dr. Whitten

## 2023-11-20 ENCOUNTER — HOSPITAL ENCOUNTER (OUTPATIENT)
Dept: MRI IMAGING | Facility: HOSPITAL | Age: 68
Discharge: HOME | End: 2023-11-20
Admitting: STUDENT IN AN ORGANIZED HEALTH CARE EDUCATION/TRAINING PROGRAM
Payer: MEDICARE

## 2023-11-20 ENCOUNTER — OFFICE VISIT (OUTPATIENT)
Dept: INTERNAL MEDICINE | Facility: CLINIC | Age: 68
End: 2023-11-20
Payer: MEDICARE

## 2023-11-20 VITALS
BODY MASS INDEX: 28.28 KG/M2 | DIASTOLIC BLOOD PRESSURE: 68 MMHG | WEIGHT: 202 LBS | TEMPERATURE: 97.8 F | HEART RATE: 77 BPM | SYSTOLIC BLOOD PRESSURE: 130 MMHG | RESPIRATION RATE: 20 BRPM | OXYGEN SATURATION: 96 % | HEIGHT: 71 IN

## 2023-11-20 DIAGNOSIS — Z12.11 ENCOUNTER FOR COLORECTAL CANCER SCREENING: ICD-10-CM

## 2023-11-20 DIAGNOSIS — Z12.12 ENCOUNTER FOR COLORECTAL CANCER SCREENING: ICD-10-CM

## 2023-11-20 DIAGNOSIS — Z00.00 ENCOUNTER FOR MEDICARE ANNUAL WELLNESS EXAM: Primary | ICD-10-CM

## 2023-11-20 DIAGNOSIS — B35.1 ONYCHOMYCOSIS: ICD-10-CM

## 2023-11-20 DIAGNOSIS — E55.9 VITAMIN D DEFICIENCY: ICD-10-CM

## 2023-11-20 DIAGNOSIS — E11.9 TYPE 2 DIABETES MELLITUS WITHOUT COMPLICATION, WITHOUT LONG-TERM CURRENT USE OF INSULIN: ICD-10-CM

## 2023-11-20 DIAGNOSIS — G31.84 MILD COGNITIVE IMPAIRMENT: ICD-10-CM

## 2023-11-20 LAB
EXPIRATION DATE: ABNORMAL
HBA1C MFR BLD: 6.6 % (ref 4.5–5.7)
Lab: ABNORMAL

## 2023-11-20 PROCEDURE — 70551 MRI BRAIN STEM W/O DYE: CPT

## 2023-11-20 RX ORDER — ERGOCALCIFEROL 1.25 MG/1
50000 CAPSULE ORAL WEEKLY
Qty: 5 CAPSULE | Refills: 3 | Status: SHIPPED | OUTPATIENT
Start: 2023-11-20

## 2023-11-20 NOTE — PROGRESS NOTES
MRI of the brain grossly normal, with only some sinus congestion on the left (associated the nasal congestion/allergies) and mild small vessel changes which are commonly seen with aging. Good news, no notable abnormalities to act on. Please continue your care as discussed at your last visit.      Dr. Whitten

## 2023-11-20 NOTE — PROGRESS NOTES
Huge improvement in A1c as discussed, keep up the great work! Please continue your care as discussed at your last visit.      Best,  Dr. Whitten

## 2023-11-20 NOTE — PROGRESS NOTES
The ABCs of the Annual Wellness Visit  Subsequent Medicare Wellness Visit    Subjective      Peter Faye is a 68 y.o. male who presents for a Subsequent Medicare Wellness Visit.    The patient is a 68-year-old male who is here for Medicare wellness. He is accompanied by an adult female his wife, Liliana.    Medicare wellness.   He has an MRI scheduled for today. He has an appointment with Dr. Guillen with GI next month and Dr. Butts with neurology in 01/2024. He has been taking vitamin D once a week and has 1 left. He has noticed changes in his energy level. He is still fatigued. He is taking quite a few naps, but he has been able to get out and peddle with his vehicle. The trembling does not seem to be like it was. His memory is still good per patient. Compared to 1 year ago, he feels his physical health is the same. His mental health is the same. He has not had any other hospital visits in the last year. He is not taking a baby aspirin. He has not had any falls since his last visit. He had a colonoscopy years ago, and they did not find anything abnormal. He is not interested in getting the pneumonia vaccine. He had a dilated eye exam earlier this year with Dr. Ferraro. He had cataracts removed from both eyes in 09/2023. He was told at the hospital that drinking a lot of water would help his diabetes. He has been drinking a lot of water. He has been watching his diet. He got glasses last week.     Nail fungus.   He has nail fungus on his left foot. He has tried a medicine that he puts on every day for 2 years, but it has not helped.    Audiology.  He is able to see the audiologist. He can hear good, but it is not clear. His tinnitus is worse.    Family history.  He denies any changes to his past medical history or family history.    The following portions of the patient's history were reviewed and   updated as appropriate: allergies, current medications, past family history, past medical history, past social history,  "past surgical history, and problem list.    Has MRI brain pending today.     Compared to one year ago, the patient feels his physical   health is the same.    Compared to one year ago, the patient feels his mental   health is the same.    Recent Hospitalizations:  This patient has had a [facility] admission record on file within the last 365 days.    Current Medical Providers:  Patient Care Team:  Raghav Whitten MD as PCP - General (Internal Medicine)  Peter Guillen MD as Consulting Physician (Gastroenterology)  Peter Butts MD as Consulting Physician (Neurology)    Outpatient Medications Prior to Visit   Medication Sig Dispense Refill    vitamin D (ERGOCALCIFEROL) 1.25 MG (98092 UT) capsule capsule Take 1 capsule by mouth 1 (One) Time Per Week. 5 capsule 3     No facility-administered medications prior to visit.       No opioid medication identified on active medication list. I have reviewed chart for other potential  high risk medication/s and harmful drug interactions in the elderly.        Aspirin is not on active medication list.  Aspirin use is not indicated based on review of current medical condition/s. Risk of harm outweighs potential benefits.  .    Patient Active Problem List   Diagnosis    Type 2 diabetes mellitus without complication, without long-term current use of insulin    EBV infection    Thrombocytopenia    Atypical lymphocytosis    Tremor    Mild cognitive impairment    Microalbuminuria    Vitamin D deficiency     Advance Care Planning   Advance Care Planning     Advance Directive is on file.  ACP discussion was held with the patient during this visit. Patient has an advance directive in EMR which is still valid.      Objective    Vitals:    11/20/23 0945   BP: 130/68   BP Location: Left arm   Patient Position: Sitting   Cuff Size: Adult   Pulse: 77   Resp: 20   Temp: 97.8 °F (36.6 °C)   TempSrc: Temporal   SpO2: 96%   Weight: 91.6 kg (202 lb)   Height: 180 cm (70.87\") " "  PainSc: 0-No pain     Estimated body mass index is 28.28 kg/m² as calculated from the following:    Height as of this encounter: 180 cm (70.87\").    Weight as of this encounter: 91.6 kg (202 lb).       Vision Screening (Inadequate exam)   Comments: Sees eye dr regularly          Does the patient have evidence of cognitive impairment?   Yes: Referral to neurology ordered.  MRI brain ordered.    Lab Results   Component Value Date    CHLPL 254 (H) 10/13/2023    TRIG 391 (H) 10/24/2023    HDL 38 (L) 10/24/2023     (H) 10/24/2023     (H) 10/13/2023    VLDL 68 (H) 10/24/2023    VLDL 41 (H) 10/13/2023    HGBA1C 8.00 (H) 2023          HEALTH RISK ASSESSMENT    Smoking Status:  Social History     Tobacco Use   Smoking Status Never   Smokeless Tobacco Never     Alcohol Consumption:  Social History     Substance and Sexual Activity   Alcohol Use Never     Fall Risk Screen:    STEADI Fall Risk Assessment was completed, and patient is at MODERATE risk for falls. Assessment completed on:2023    Depression Screenin/20/2023     9:48 AM   PHQ-2/PHQ-9 Depression Screening   Little Interest or Pleasure in Doing Things 0-->not at all   Feeling Down, Depressed or Hopeless 0-->not at all   PHQ-9: Brief Depression Severity Measure Score 0       Health Habits and Functional and Cognitive Screenin/20/2023     9:47 AM   Functional & Cognitive Status   Do you have difficulty preparing food and eating? No   Do you have difficulty bathing yourself, getting dressed or grooming yourself? No   Do you have difficulty using the toilet? No   Do you have difficulty moving around from place to place? No   Do you have trouble with steps or getting out of a bed or a chair? Yes   Current Diet Unhealthy Diet   Dental Exam Up to date   Eye Exam Up to date   Exercise (times per week) 3 times per week   Current Exercises Include Walking   Do you need help using the phone?  No   Are you deaf or do you have " serious difficulty hearing?  Yes   Do you need help to go to places out of walking distance? No   Do you need help shopping? No   Do you need help preparing meals?  No   Do you need help with housework?  No   Do you need help with laundry? No   Do you need help taking your medications? No   Do you need help managing money? No   Do you ever drive or ride in a car without wearing a seat belt? No   Have you felt unusual stress, anger or loneliness in the last month? No   Who do you live with? Spouse   If you need help, do you have trouble finding someone available to you? No   Have you been bothered in the last four weeks by sexual problems? No   Do you have difficulty concentrating, remembering or making decisions? Yes       Age-appropriate Screening Schedule:  Refer to the list below for future screening recommendations based on patient's age, sex and/or medical conditions. Orders for these recommended tests are listed in the plan section. The patient has been provided with a written plan.    Health Maintenance   Topic Date Due    COLORECTAL CANCER SCREENING  Never done    TDAP/TD VACCINES (1 - Tdap) Never done    DIABETIC EYE EXAM  Never done    ZOSTER VACCINE (1 of 2) 11/20/2023 (Originally 1/5/2005)    COVID-19 Vaccine (1) 11/22/2023 (Originally 1955)    INFLUENZA VACCINE  03/31/2024 (Originally 8/1/2023)    Pneumococcal Vaccine 65+ (1 - PCV) 11/20/2024 (Originally 1/5/1961)    HEMOGLOBIN A1C  03/12/2024    BMI FOLLOWUP  09/26/2024    URINE MICROALBUMIN  10/24/2024    ANNUAL WELLNESS VISIT  11/20/2024    DIABETIC FOOT EXAM  11/20/2024    HEPATITIS C SCREENING  Completed                  CMS Preventative Services Quick Reference  Risk Factors Identified During Encounter:    Fall Risk-High or Moderate: Discussed Fall Prevention in the home  Hearing Problem:  following with audiology, having hearing aids adjusted  Immunizations Discussed/Encouraged: Influenza, Prevnar 20 (Pneumococcal 20-valent conjugate),  Shingrix, and COVID19  Inactivity/Sedentary: Patient was advised to exercise at least 150 minutes a week per CDC recommendations.  Dental Screening Recommended  Vision Screening Recommended  Mild cognitive impairment: recommend MRI brain and f/u with neurology    The above risks/problems have been discussed with the patient.  Pertinent information has been shared with the patient in the After Visit Summary.    Diagnoses and all orders for this visit:    1. Encounter for Medicare annual wellness exam (Primary)    2. Vitamin D deficiency  -     vitamin D (ERGOCALCIFEROL) 1.25 MG (16377 UT) capsule capsule; Take 1 capsule by mouth 1 (One) Time Per Week.  Dispense: 5 capsule; Refill: 3    3. Type 2 diabetes mellitus without complication, without long-term current use of insulin  -     POC Glycosylated Hemoglobin (Hb A1C); Future  -     POC Glycosylated Hemoglobin (Hb A1C)    4. Encounter for colorectal cancer screening  -     Cologuard - Stool, Per Rectum; Future    5. Onychomycosis  -     Ciclopirox 8 % kit; Apply 1 Application topically Every Night. Remove with rubbing alcohol once weekly.  Dispense: 1 each; Refill: 11      1. Medicare wellness.  His A1c was elevated when he was in the hospital. I will send in a refill of his vitamin D. He was encouraged to eat a healthy diet with vegetables, fruits, and less fried foods. He was also encouraged to walk 5 times a week for 30 minutes a time. I will do a Cologuard test. He was offered the above listed vaccines, but he declined. I will check his A1c today.     2. Elevated cholesterol.  His LDL is still elevated. I will recheck his cholesterol in 3 months.  We did discuss the potential for needing a statin medication in the future.    3. Onychomycosis.  I will prescribe ciclopirox to be applied nightly and wipe it off with rubbing alcohol once a week.    Follow-up  The patient will follow up in 3 months.  Follow Up:   Next Medicare Wellness visit to be scheduled in 1 year.   RTC in 3 months for fasting labs and recheck cholesterol, liver enzymes    An After Visit Summary and PPPS were made available to the patient.    Dr. Raghav Whitten          Transcribed from ambient dictation for Raghav Whitten MD by Jenn Fernandez.  11/20/23   10:49 EST    Patient or patient representative verbalized consent to the visit recording.  I have personally performed the services described in this document as transcribed by the above individual, and it is both accurate and complete.

## 2023-11-20 NOTE — PATIENT INSTRUCTIONS
Health Maintenance, Male  A healthy lifestyle and preventive care is important for your health and wellness. Ask your health care provider about what schedule of regular examinations is right for you.  What should I know about weight and diet?    Eat a Healthy Diet  Eat plenty of vegetables, fruits, whole grains, low-fat dairy products, and lean protein.  Do not eat a lot of foods high in solid fats, added sugars, or salt.     Maintain a Healthy Weight  Regular exercise can help you achieve or maintain a healthy weight. You should:  Do at least 150 minutes of exercise each week. The exercise should increase your heart rate and make you sweat (moderate-intensity exercise).  Do strength-training exercises at least twice a week.     Watch Your Levels of Cholesterol and Blood Lipids  Have your blood tested for lipids and cholesterol every 5 years starting at 35 years of age. If you are at high risk for heart disease, you should start having your blood tested when you are 20 years old. You may need to have your cholesterol levels checked more often if:  Your lipid or cholesterol levels are high.  You are older than 50 years of age.  You are at high risk for heart disease.     What should I know about cancer screening?  Many types of cancers can be detected early and may often be prevented.  Lung Cancer  You should be screened every year for lung cancer if:  You are a current smoker who has smoked for at least 30 years.  You are a former smoker who has quit within the past 15 years.  Talk to your health care provider about your screening options, when you should start screening, and how often you should be screened.     Colorectal Cancer  Routine colorectal cancer screening usually begins at 50 years of age and should be repeated every 5-10 years until you are 75 years old. You may need to be screened more often if early forms of precancerous polyps or small growths are found. Your health care provider may recommend  screening at an earlier age if you have risk factors for colon cancer.  Your health care provider may recommend using home test kits to check for hidden blood in the stool.  A small camera at the end of a tube can be used to examine your colon (sigmoidoscopy or colonoscopy). This checks for the earliest forms of colorectal cancer.     Prostate and Testicular Cancer  Depending on your age and overall health, your health care provider may do certain tests to screen for prostate and testicular cancer.  Talk to your health care provider about any symptoms or concerns you have about testicular or prostate cancer.     Skin Cancer  Check your skin from head to toe regularly.  Tell your health care provider about any new moles or changes in moles, especially if:  There is a change in a mole’s size, shape, or color.  You have a mole that is larger than a pencil eraser.  Always use sunscreen. Apply sunscreen liberally and repeat throughout the day.  Protect yourself by wearing long sleeves, pants, a wide-brimmed hat, and sunglasses when outside.     What should I know about heart disease, diabetes, and high blood pressure?  If you are 18-39 years of age, have your blood pressure checked every 3-5 years. If you are 40 years of age or older, have your blood pressure checked every year. You should have your blood pressure measured twice--once when you are at a hospital or clinic, and once when you are not at a hospital or clinic. Record the average of the two measurements. To check your blood pressure when you are not at a hospital or clinic, you can use:  An automated blood pressure machine at a pharmacy.  A home blood pressure monitor.  Talk to your health care provider about your target blood pressure.  If you are between 45-79 years old, ask your health care provider if you should take aspirin to prevent heart disease.  Have regular diabetes screenings by checking your fasting blood sugar level.  If you are at a normal  weight and have a low risk for diabetes, have this test once every three years after the age of 45.  If you are overweight and have a high risk for diabetes, consider being tested at a younger age or more often.  A one-time screening for abdominal aortic aneurysm (AAA) by ultrasound is recommended for men aged 65-75 years who are current or former smokers.  What should I know about preventing infection?  Hepatitis B  If you have a higher risk for hepatitis B, you should be screened for this virus. Talk with your health care provider to find out if you are at risk for hepatitis B infection.  Hepatitis C  Blood testing is recommended for:  Everyone born from 1945 through 1965.  Anyone with known risk factors for hepatitis C.     Sexually Transmitted Diseases (STDs)  You should be screened each year for STDs including gonorrhea and chlamydia if:  You are sexually active and are younger than 24 years of age.  You are older than 24 years of age and your health care provider tells you that you are at risk for this type of infection.  Your sexual activity has changed since you were last screened and you are at an increased risk for chlamydia or gonorrhea. Ask your health care provider if you are at risk.  Talk with your health care provider about whether you are at high risk of being infected with HIV. Your health care provider may recommend a prescription medicine to help prevent HIV infection.     What else can I do?    Schedule regular health, dental, and eye exams.  Stay current with your vaccines (immunizations).  Do not use any tobacco products, such as cigarettes, chewing tobacco, and e-cigarettes. If you need help quitting, ask your health care provider.  Limit alcohol intake to no more than 2 drinks per day. One drink equals 12 ounces of beer, 5 ounces of wine, or 1½ ounces of hard liquor.  Do not use street drugs.  Do not share needles.  Ask your health care provider for help if you need support or information  about quitting drugs.  Tell your health care provider if you often feel depressed.  Tell your health care provider if you have ever been abused or do not feel safe at home.      This information is not intended to replace advice given to you by your health care provider. Make sure you discuss any questions you have with your health care provider.  Document Released: 06/15/2009 Document Revised: 08/16/2017 Document Reviewed: 09/20/2016  [employer] Interactive Patient Education © 2018 [employer]    Healthy Eating  Following a healthy eating pattern may help you to achieve and maintain a healthy body weight, reduce the risk of chronic disease, and live a long and productive life. It is important to follow a healthy eating pattern at an appropriate calorie level for your body. Your nutritional needs should be met primarily through food by choosing a variety of nutrient-rich foods.  What are tips for following this plan?  Reading food labels  Read labels and choose the following:  Reduced or low sodium.  Juices with 100% fruit juice.  Foods with low saturated fats and high polyunsaturated and monounsaturated fats.  Foods with whole grains, such as whole wheat, cracked wheat, brown rice, and wild rice.  Whole grains that are fortified with folic acid. This is recommended for women who are pregnant or who want to become pregnant.  Read labels and avoid the following:  Foods with a lot of added sugars. These include foods that contain brown sugar, corn sweetener, corn syrup, dextrose, fructose, glucose, high-fructose corn syrup, honey, invert sugar, lactose, malt syrup, maltose, molasses, raw sugar, sucrose, trehalose, or turbinado sugar.  Do not eat more than the following amounts of added sugar per day:  6 teaspoons (25 g) for women.  9 teaspoons (38 g) for men.  Foods that contain processed or refined starches and grains.  Refined grain products, such as white flour, degermed cornmeal, white bread, and white  "rice.  Shopping  Choose nutrient-rich snacks, such as vegetables, whole fruits, and nuts. Avoid high-calorie and high-sugar snacks, such as potato chips, fruit snacks, and candy.  Use oil-based dressings and spreads on foods instead of solid fats such as butter, stick margarine, or cream cheese.  Limit pre-made sauces, mixes, and \"instant\" products such as flavored rice, instant noodles, and ready-made pasta.  Try more plant-protein sources, such as tofu, tempeh, black beans, edamame, lentils, nuts, and seeds.  Explore eating plans such as the Mediterranean diet or vegetarian diet.  Cooking  Use oil to sauté or stir-george foods instead of solid fats such as butter, stick margarine, or lard.  Try baking, boiling, grilling, or broiling instead of frying.  Remove the fatty part of meats before cooking.  Steam vegetables in water or broth.  Meal planning    At meals, imagine dividing your plate into fourths:  One-half of your plate is fruits and vegetables.  One-fourth of your plate is whole grains.  One-fourth of your plate is protein, especially lean meats, poultry, eggs, tofu, beans, or nuts.  Include low-fat dairy as part of your daily diet.     Lifestyle  Choose healthy options in all settings, including home, work, school, restaurants, or stores.  Prepare your food safely:  Wash your hands after handling raw meats.  Keep food preparation surfaces clean by regularly washing with hot, soapy water.  Keep raw meats separate from ready-to-eat foods, such as fruits and vegetables.  , meat, poultry, and eggs to the recommended internal temperature.  Store foods at safe temperatures. In general:  Keep cold foods at 40°F (4.4°C) or below.  Keep hot foods at 140°F (60°C) or above.  Keep your freezer at 0°F (-17.8°C) or below.  Foods are no longer safe to eat when they have been between the temperatures of 40°-140°F (4.4-60°C) for more than 2 hours.  What foods should I eat?  Fruits  Aim to eat 2 cup-equivalents of " fresh, canned (in natural juice), or frozen fruits each day. Examples of 1 cup-equivalent of fruit include 1 small apple, 8 large strawberries, 1 cup canned fruit, ½ cup dried fruit, or 1 cup 100% juice.  Vegetables  Aim to eat 2½-3 cup-equivalents of fresh and frozen vegetables each day, including different varieties and colors. Examples of 1 cup-equivalent of vegetables include 2 medium carrots, 2 cups raw, leafy greens, 1 cup chopped vegetable (raw or cooked), or 1 medium baked potato.  Grains  Aim to eat 6 ounce-equivalents of whole grains each day. Examples of 1 ounce-equivalent of grains include 1 slice of bread, 1 cup ready-to-eat cereal, 3 cups popcorn, or ½ cup cooked rice, pasta, or cereal.  Meats and other proteins  Aim to eat 5-6 ounce-equivalents of protein each day. Examples of 1 ounce-equivalent of protein include 1 egg, 1/2 cup nuts or seeds, or 1 tablespoon (16 g) peanut butter. A cut of meat or fish that is the size of a deck of cards is about 3-4 ounce-equivalents.  Of the protein you eat each week, try to have at least 8 ounces come from seafood. This includes salmon, trout, herring, and anchovies.  Dairy  Aim to eat 3 cup-equivalents of fat-free or low-fat dairy each day. Examples of 1 cup-equivalent of dairy include 1 cup (240 mL) milk, 8 ounces (250 g) yogurt, 1½ ounces (44 g) natural cheese, or 1 cup (240 mL) fortified soy milk.  Fats and oils  Aim for about 5 teaspoons (21 g) per day. Choose monounsaturated fats, such as canola and olive oils, avocados, peanut butter, and most nuts, or polyunsaturated fats, such as sunflower, corn, and soybean oils, walnuts, pine nuts, sesame seeds, sunflower seeds, and flaxseed.  Beverages  Aim for six 8-oz glasses of water per day. Limit coffee to three to five 8-oz cups per day.  Limit caffeinated beverages that have added calories, such as soda and energy drinks.  Limit alcohol intake to no more than 1 drink a day for nonpregnant women and 2 drinks a day  for men. One drink equals 12 oz of beer (355 mL), 5 oz of wine (148 mL), or 1½ oz of hard liquor (44 mL).  Seasoning and other foods  Avoid adding excess amounts of salt to your foods. Try flavoring foods with herbs and spices instead of salt.  Avoid adding sugar to foods.  Try using oil-based dressings, sauces, and spreads instead of solid fats.  This information is based on general [place] nutrition guidelines. For more information, visit choosemyplate.gov. Exact amounts may vary based on your nutrition needs.  Summary  A healthy eating plan may help you to maintain a healthy weight, reduce the risk of chronic diseases, and stay active throughout your life.  Plan your meals. Make sure you eat the right portions of a variety of nutrient-rich foods.  Try baking, boiling, grilling, or broiling instead of frying.  Choose healthy options in all settings, including home, work, school, restaurants, or stores.  This information is not intended to replace advice given to you by your health care provider. Make sure you discuss any questions you have with your health care provider.  Document Revised: 04/01/2019 Document Reviewed: 04/01/2019  [employer] Patient Education © 2021 [employer]    Exercising to Stay Healthy  To become healthy and stay healthy, it is recommended that you do moderate-intensity and vigorous-intensity exercise. You can tell that you are exercising at a moderate intensity if your heart starts beating faster and you start breathing faster but can still hold a conversation. You can tell that you are exercising at a vigorous intensity if you are breathing much harder and faster and cannot hold a conversation while exercising.  Exercising regularly is important. It has many health benefits, such as:  Improving overall fitness, flexibility, and endurance.  Increasing bone density.  Helping with weight control.  Decreasing body fat.  Increasing muscle strength.  Reducing stress and tension.  Improving overall  health.  How often should I exercise?  Choose an activity that you enjoy, and set realistic goals. Your health care provider can help you make an activity plan that works for you.  Exercise regularly as told by your health care provider. This may include:  Doing strength training two times a week, such as:  Lifting weights.  Using resistance bands.  Push-ups.  Sit-ups.  Yoga.  Doing a certain intensity of exercise for a given amount of time. Choose from these options:  A total of 150 minutes of moderate-intensity exercise every week.  A total of 75 minutes of vigorous-intensity exercise every week.  A mix of moderate-intensity and vigorous-intensity exercise every week.  Children, pregnant women, people who have not exercised regularly, people who are overweight, and older adults may need to talk with a health care provider about what activities are safe to do. If you have a medical condition, be sure to talk with your health care provider before you start a new exercise program.  What are some exercise ideas?    Moderate-intensity exercise ideas include:  Walking 1 mile (1.6 km) in about 15 minutes.  Biking.  Hiking.  Golfing.  Dancing.  Water aerobics.  Vigorous-intensity exercise ideas include:  Walking 4.5 miles (7.2 km) or more in about 1 hour.  Jogging or running 5 miles (8 km) in about 1 hour.  Biking 10 miles (16.1 km) or more in about 1 hour.  Lap swimming.  Roller-skating or in-line skating.  Cross-country skiing.  Vigorous competitive sports, such as football, basketball, and soccer.  Jumping rope.  Aerobic dancing.  What are some everyday activities that can help me to get exercise?  Yard work, such as:  Pushing a .  Raking and bagging leaves.  Washing your car.  Pushing a stroller.  Shoveling snow.  Gardening.  Washing windows or floors.  How can I be more active in my day-to-day activities?  Use stairs instead of an elevator.  Take a walk during your lunch break.  If you drive, park your car  "farther away from your work or school.  If you take public transportation, get off one stop early and walk the rest of the way.  Stand up or walk around during all of your indoor phone calls.  Get up, stretch, and walk around every 30 minutes throughout the day.  Enjoy exercise with a friend. Support to continue exercising will help you keep a regular routine of activity.  What guidelines can I follow while exercising?  Before you start a new exercise program, talk with your health care provider.  Do not exercise so much that you hurt yourself, feel dizzy, or get very short of breath.  Wear comfortable clothes and wear shoes with good support.  Drink plenty of water while you exercise to prevent dehydration or heat stroke.  Work out until your breathing and your heartbeat get faster.  Where to find more information  [place] Department of Health and Human Services: www.hhs.gov  Centers for Disease Control and Prevention (CDC): www.cdc.gov  Summary  Exercising regularly is important. It will improve your overall fitness, flexibility, and endurance.  Regular exercise also will improve your overall health. It can help you control your weight, reduce stress, and improve your bone density.  Do not exercise so much that you hurt yourself, feel dizzy, or get very short of breath.  Before you start a new exercise program, talk with your health care provider.  This information is not intended to replace advice given to you by your health care provider. Make sure you discuss any questions you have with your health care provider.  Document Revised: 11/30/2018 Document Reviewed: 11/08/2018  [employer] Patient Education © 2021 [employer]      Treatment for high cholesterol depends on which lipid is high. In many cases, the focus of treatment is to reduce high LDL (\"bad\") cholesterol levels. LDL cholesterol sometimes can be lowered without medication (nonpharmacological therapy), but often, medication (pharmacological therapy) is " necessary.    Standard nonpharmacological therapy consists primarily of modifying diet and lifestyle. This therapy may modestly reduce LDL cholesterol, but is not likely to lower the LDL cholesterol level more than about 30 mg/dL.    In patients without atherosclerosis who have modestly elevated LDL cholesterol levels, treatment with medication is not urgent, and an initial 6-12 month trial of nonpharmacological therapy may be advised. If the LDL cholesterol falls to an acceptable level within this time, the patient can continue with this treatment only. If the level remains high, however, pharmacological therapy should be initiated.    Lifestyle changes that may lower LDL cholesterol levels include the following:    Diet:    Minimize cholesterol and fat intake, especially saturated fat, which raises cholesterol levels more than any other substance. Cholesterol and saturated fats are found primarily in foods derived from animals, such as meats and dairy products.     Dietary guidelines for reducing cholesterol and fat consumption:  Eat lean fish, poultry, and meat. Remove the skin from chicken and trim the fat from beef before cooking.    Avoid commercially prepared and processed food (e.g., cakes, cookies) and breaded fried foods.  Increase the intake of fruits, vegetables, breads, cereals, rice, legumes (e.g., beans, peas), and pasta.    Use skim or 1% milk.    Eat no more than 2 egg yolks (or whole eggs) per week.    Use cooking oils that are high in unsaturated fat (e.g., corn, olive, canola, safflower oils)    Use soft margarine, which contains less saturated fat than butter.    The Food and Drug Administration (FDA) recently approved two cholesterol-lowering margarine products (Benecol and Take Control). These margarines contain plant-derived substances that can decrease the absorption of cholesterol in the digestive tract and may reduce cholesterol by about 7-10 percent. They should not be used instead of  drug therapy, but may be added to a nonpharmacological treatment plan for high cholesterol.    Weight loss:   Losing a modest amount of weight (even 5-10 lbs.) can double the reduction in LDL levels achieved through an improved diet. Weight loss should be gradual.    Exercise:   Exercise can decrease LDL levels and increase HDL levels. For example, taking a brisk 30-minute walk 3-4 times a week can positively impact cholesterol levels. Patients with chest pain and/or known or suspected heart disease should talk to their physician before beginning any exercise program.

## 2023-12-28 ENCOUNTER — OFFICE VISIT (OUTPATIENT)
Dept: GASTROENTEROLOGY | Facility: CLINIC | Age: 68
End: 2023-12-28
Payer: MEDICARE

## 2023-12-28 ENCOUNTER — LAB (OUTPATIENT)
Dept: LAB | Facility: HOSPITAL | Age: 68
End: 2023-12-28
Payer: MEDICARE

## 2023-12-28 VITALS
OXYGEN SATURATION: 98 % | WEIGHT: 210.6 LBS | HEIGHT: 72 IN | TEMPERATURE: 97.5 F | BODY MASS INDEX: 28.53 KG/M2 | HEART RATE: 93 BPM

## 2023-12-28 DIAGNOSIS — E78.9 DISORDER OF LIPOPROTEIN METABOLISM, UNSPECIFIED: ICD-10-CM

## 2023-12-28 DIAGNOSIS — Z11.59 ENCOUNTER FOR SCREENING FOR OTHER VIRAL DISEASES: ICD-10-CM

## 2023-12-28 DIAGNOSIS — R74.8 ABNORMAL LEVELS OF OTHER SERUM ENZYMES: ICD-10-CM

## 2023-12-28 DIAGNOSIS — R79.89 ELEVATED LIVER FUNCTION TESTS: ICD-10-CM

## 2023-12-28 DIAGNOSIS — R79.89 ELEVATED LIVER FUNCTION TESTS: Primary | ICD-10-CM

## 2023-12-28 LAB
ALBUMIN SERPL-MCNC: 4.3 G/DL (ref 3.5–5.2)
ALBUMIN/GLOB SERPL: 1 G/DL
ALP SERPL-CCNC: 69 U/L (ref 39–117)
ALT SERPL W P-5'-P-CCNC: 44 U/L (ref 1–41)
ANION GAP SERPL CALCULATED.3IONS-SCNC: 13.6 MMOL/L (ref 5–15)
AST SERPL-CCNC: 36 U/L (ref 1–40)
BILIRUB SERPL-MCNC: 0.4 MG/DL (ref 0–1.2)
BUN SERPL-MCNC: 12 MG/DL (ref 8–23)
BUN/CREAT SERPL: 11.8 (ref 7–25)
CALCIUM SPEC-SCNC: 9.8 MG/DL (ref 8.6–10.5)
CHLORIDE SERPL-SCNC: 102 MMOL/L (ref 98–107)
CO2 SERPL-SCNC: 23.4 MMOL/L (ref 22–29)
CREAT SERPL-MCNC: 1.02 MG/DL (ref 0.76–1.27)
DEPRECATED RDW RBC AUTO: 40.8 FL (ref 37–54)
EGFRCR SERPLBLD CKD-EPI 2021: 80.1 ML/MIN/1.73
ERYTHROCYTE [DISTWIDTH] IN BLOOD BY AUTOMATED COUNT: 13.2 % (ref 12.3–15.4)
FERRITIN SERPL-MCNC: 1035 NG/ML (ref 30–400)
GLOBULIN UR ELPH-MCNC: 4.1 GM/DL
GLUCOSE SERPL-MCNC: 112 MG/DL (ref 65–99)
HCT VFR BLD AUTO: 47.8 % (ref 37.5–51)
HGB BLD-MCNC: 16 G/DL (ref 13–17.7)
IRON 24H UR-MRATE: 100 MCG/DL (ref 59–158)
MCH RBC QN AUTO: 28.5 PG (ref 26.6–33)
MCHC RBC AUTO-ENTMCNC: 33.5 G/DL (ref 31.5–35.7)
MCV RBC AUTO: 85.2 FL (ref 79–97)
PLATELET # BLD AUTO: 204 10*3/MM3 (ref 140–450)
PMV BLD AUTO: 9.8 FL (ref 6–12)
POTASSIUM SERPL-SCNC: 3.9 MMOL/L (ref 3.5–5.2)
PROT SERPL-MCNC: 8.4 G/DL (ref 6–8.5)
RBC # BLD AUTO: 5.61 10*6/MM3 (ref 4.14–5.8)
SODIUM SERPL-SCNC: 139 MMOL/L (ref 136–145)
WBC NRBC COR # BLD AUTO: 7.32 10*3/MM3 (ref 3.4–10.8)

## 2023-12-28 PROCEDURE — 85027 COMPLETE CBC AUTOMATED: CPT

## 2023-12-28 PROCEDURE — 86708 HEPATITIS A ANTIBODY: CPT

## 2023-12-28 PROCEDURE — 86376 MICROSOMAL ANTIBODY EACH: CPT

## 2023-12-28 PROCEDURE — 82728 ASSAY OF FERRITIN: CPT

## 2023-12-28 PROCEDURE — 83010 ASSAY OF HAPTOGLOBIN QUANT: CPT | Performed by: INTERNAL MEDICINE

## 2023-12-28 PROCEDURE — 82247 BILIRUBIN TOTAL: CPT | Performed by: INTERNAL MEDICINE

## 2023-12-28 PROCEDURE — 1159F MED LIST DOCD IN RCRD: CPT | Performed by: INTERNAL MEDICINE

## 2023-12-28 PROCEDURE — 83883 ASSAY NEPHELOMETRY NOT SPEC: CPT | Performed by: INTERNAL MEDICINE

## 2023-12-28 PROCEDURE — 36415 COLL VENOUS BLD VENIPUNCTURE: CPT | Performed by: INTERNAL MEDICINE

## 2023-12-28 PROCEDURE — 83516 IMMUNOASSAY NONANTIBODY: CPT

## 2023-12-28 PROCEDURE — 84466 ASSAY OF TRANSFERRIN: CPT

## 2023-12-28 PROCEDURE — 83540 ASSAY OF IRON: CPT

## 2023-12-28 PROCEDURE — 80053 COMPREHEN METABOLIC PANEL: CPT

## 2023-12-28 PROCEDURE — 86015 ACTIN ANTIBODY EACH: CPT

## 2023-12-28 PROCEDURE — 82947 ASSAY GLUCOSE BLOOD QUANT: CPT | Performed by: INTERNAL MEDICINE

## 2023-12-28 PROCEDURE — 84478 ASSAY OF TRIGLYCERIDES: CPT | Performed by: INTERNAL MEDICINE

## 2023-12-28 PROCEDURE — 82465 ASSAY BLD/SERUM CHOLESTEROL: CPT | Performed by: INTERNAL MEDICINE

## 2023-12-28 PROCEDURE — 99214 OFFICE O/P EST MOD 30 MIN: CPT | Performed by: INTERNAL MEDICINE

## 2023-12-28 PROCEDURE — 82977 ASSAY OF GGT: CPT | Performed by: INTERNAL MEDICINE

## 2023-12-28 PROCEDURE — 82172 ASSAY OF APOLIPOPROTEIN: CPT | Performed by: INTERNAL MEDICINE

## 2023-12-28 PROCEDURE — 1160F RVW MEDS BY RX/DR IN RCRD: CPT | Performed by: INTERNAL MEDICINE

## 2023-12-28 NOTE — LETTER
December 28, 2023    Peter Faye   [id number]  [facility] [address]                                Peter Guillen MD

## 2023-12-28 NOTE — PROGRESS NOTES
PCP:  Raghav Whitten MD [place], [place], [place]  [address] [facility]  [address]  [place] [id number]    Chief Complaint   Patient presents with    Elevated Liver Enzymes and Fatty Liver         HPI   The patient is a 68-year-old who did have troubles with nausea and vomiting and diarrhea.  He was even hospitalized.  This was in September.  Cologuard test was negative.  Thankfully, this is all resolved.  He did have a decreased appetite as well.  His liver tests were found elevated.  Ultrasound of the abdomen showed borderline splenomegaly and fatty liver.  CBC 10/24/2023 showed a normal white count at 6.83.  Hemoglobin was 14.9.  Hematocrit was 44.1.  Platelet count was 224.  He did have some thrombocytopenia at the time of his hospitalization on 9/15/2023 with a platelet count of 100.  His liver chemistries have been elevated.  His ALT on 10/20/2023 was 51.  His AST was 44.  His alk phos was 76 and bilirubin 0.5.  Interestingly when he was hospitalized his albumin level was 2.4 and now his albumin level is up to 4.3.  Overall he feels much better.  He had some serologies done.  His YAHAIRA was negative, YAHAIRA negative, TTG IgA negative and alpha-1 antitrypsin level and ceruloplasmin were normal.  His hepatitis B surface antigen and hepatitis C test was negative as well.  He has no history of alcohol use or smoking.  He has no family history of liver disease.  He has no history of tattoos or blood transfusions.  His mother had pancreatic cancer.  Patient has had cataract surgery and history of kidney stones.  He has hyperglycemia.  Last colonoscopy was 1976.    No Known Allergies       Current Outpatient Medications:     Ciclopirox 8 % kit, Apply 1 Application topically Every Night. Remove with rubbing alcohol once weekly., Disp: 1 each, Rfl: 11    vitamin D (ERGOCALCIFEROL) 1.25 MG (88197 UT) capsule capsule, Take 1 capsule by mouth 1 (One) Time Per Week., Disp: 5 capsule, Rfl: 3     Past Medical History:   Diagnosis  Date    Cataract August 2023    Fatty liver     HL (hearing loss) 2010    Nephrolithiasis        Past Surgical History:   Procedure Laterality Date    EYE SURGERY  Sept 18 2023    Cataract        Social History     Socioeconomic History    Marital status:    Tobacco Use    Smoking status: Never    Smokeless tobacco: Never   Vaping Use    Vaping Use: Never used    Passive vaping exposure: Yes   Substance and Sexual Activity    Alcohol use: Never    Drug use: Never    Sexual activity: Not Currently     Partners: Female        Family History   Problem Relation Age of Onset    Cancer Mother         pancreatic    Diabetes Mother     Arthritis Father     Stroke Father 94    Heart attack Father     Liver cancer Brother     Diabetes Brother     Colon cancer Neg Hx     Prostate cancer Neg Hx         Review of Systems     Vitals:    12/28/23 1405   Pulse: 93   Temp: 97.5 °F (36.4 °C)   SpO2: 98%        Physical Exam  Constitutional:       General: He is not in acute distress.     Appearance: Normal appearance. He is not ill-appearing.   Neurological:      Mental Status: He is alert.          Diagnoses and all orders for this visit:    1. Elevated liver function tests (Primary)  -     Comprehensive Metabolic Panel; Future  -     CBC (No Diff); Future  -     Hepatitis A Antibody, Total; Future  -     Iron; Future  -     Soluble Liver Ag (IgG Ab); Future  -     Transferrin Saturation; Future  -     Ferritin; Future  -     Anti-Smooth Muscle Antibody Titer; Future  -     Anti-microsomal Antibody; Future  -     COELHO Fibrosure    2. Encounter for screening for other viral diseases  -     COELHO Fibrosure    3. Abnormal levels of other serum enzymes  -     COELHO Fibrosure    4. Disorder of lipoprotein metabolism, unspecified  -     COELHO Fibrosure    Impressions and plan #1 elevated liver tests: I am in to check the serologies that have been checked.  I will recheck his autoimmune markers.  I will check an COELHO Fibrosure as well  as he does have some fatty liver.  Thankfully, what ever was going on seems to be improved.  His blood work looks better, he feels better and his diarrhea has resolved.  His nausea and vomiting is resolved as well.      #2 nausea vomiting and diarrhea: Thankfully this all is resolved.  If he has continued troubles with his bowels we will plan a colonoscopy at that point.  He had a recent Cologuard which was negative.    Peter Guillen MD

## 2023-12-30 LAB — HAV AB SER QL IA: NEGATIVE

## 2024-01-01 LAB
IRON SATN MFR SERPL: 30 % SATURATION
IRON SERPL-MCNC: 101 UG/DL
TRANSFERRIN SERPL-MCNC: 241 MG/DL

## 2024-01-02 LAB
LKM-1 AB SER-ACNC: 2 UNITS (ref 0–20)
SMA IGG SER-ACNC: 22 UNITS (ref 0–19)
SOLUBLE LIVER IGG SER IA-ACNC: 1.2 UNITS (ref 0–20)

## 2024-01-03 LAB
A2 MACROGLOB SERPL-MCNC: 274 MG/DL (ref 110–276)
ALT SERPL W P-5'-P-CCNC: 52 IU/L (ref 0–55)
APO A-I SERPL-MCNC: 142 MG/DL (ref 101–178)
AST SERPL W P-5'-P-CCNC: 45 IU/L (ref 0–40)
BILIRUB SERPL-MCNC: <0.1 MG/DL (ref 0–1.2)
CHOLEST SERPL-MCNC: 254 MG/DL (ref 100–199)
FIBROSIS SCORING:: ABNORMAL
FIBROSIS STAGE SERPL QL: ABNORMAL
GGT SERPL-CCNC: 96 IU/L (ref 0–65)
GLUCOSE SERPL-MCNC: 110 MG/DL (ref 70–99)
HAPTOGLOB SERPL-MCNC: 85 MG/DL (ref 32–363)
LABORATORY COMMENT REPORT: ABNORMAL
LIVER FIBR SCORE SERPL CALC.FIBROSURE: 0.38 (ref 0–0.21)
LIVER STEATOSIS GRADE SERPL QL: ABNORMAL
LIVER STEATOSIS SCORE SERPL: 0.83 (ref 0–0.4)
NASH GRADE SERPL QL: ABNORMAL
NASH INTERPRETATION SERPL-IMP: ABNORMAL
NASH SCORE SERPL: 0.75 (ref 0–0.25)
NASH SCORING: ABNORMAL
STEATOSIS SCORING: ABNORMAL
TEST PERFORMANCE INFO SPEC: ABNORMAL
TEST PERFORMANCE INFO SPEC: ABNORMAL
TRIGL SERPL-MCNC: 640 MG/DL (ref 0–149)

## 2024-01-08 ENCOUNTER — PATIENT MESSAGE (OUTPATIENT)
Dept: GASTROENTEROLOGY | Facility: CLINIC | Age: 69
End: 2024-01-08
Payer: COMMERCIAL

## 2024-03-29 ENCOUNTER — OFFICE VISIT (OUTPATIENT)
Dept: INTERNAL MEDICINE | Facility: CLINIC | Age: 69
End: 2024-03-29
Payer: MEDICARE

## 2024-03-29 VITALS
BODY MASS INDEX: 27.96 KG/M2 | TEMPERATURE: 97.7 F | HEART RATE: 77 BPM | DIASTOLIC BLOOD PRESSURE: 72 MMHG | SYSTOLIC BLOOD PRESSURE: 130 MMHG | WEIGHT: 206.13 LBS | RESPIRATION RATE: 20 BRPM

## 2024-03-29 DIAGNOSIS — E78.2 MIXED HYPERLIPIDEMIA: ICD-10-CM

## 2024-03-29 DIAGNOSIS — R79.89 ELEVATED FERRITIN: ICD-10-CM

## 2024-03-29 DIAGNOSIS — E11.9 TYPE 2 DIABETES MELLITUS WITHOUT COMPLICATION, WITHOUT LONG-TERM CURRENT USE OF INSULIN: Primary | ICD-10-CM

## 2024-03-29 DIAGNOSIS — E55.9 VITAMIN D DEFICIENCY: ICD-10-CM

## 2024-03-29 PROBLEM — D69.6 THROMBOCYTOPENIA: Status: RESOLVED | Noted: 2023-09-26 | Resolved: 2024-03-29

## 2024-03-29 PROBLEM — D72.820 ATYPICAL LYMPHOCYTOSIS: Status: RESOLVED | Noted: 2023-09-26 | Resolved: 2024-03-29

## 2024-03-29 LAB
EXPIRATION DATE: ABNORMAL
HBA1C MFR BLD: 6.8 % (ref 4.5–5.7)
Lab: ABNORMAL

## 2024-03-29 RX ORDER — ROSUVASTATIN CALCIUM 10 MG/1
10 TABLET, COATED ORAL DAILY
Qty: 90 TABLET | Refills: 3 | Status: SHIPPED | OUTPATIENT
Start: 2024-03-29

## 2024-03-29 NOTE — PATIENT INSTRUCTIONS
"Treatment for high cholesterol depends on which lipid is high. In many cases, the focus of treatment is to reduce high LDL (\"bad\") cholesterol levels. LDL cholesterol sometimes can be lowered without medication (nonpharmacological therapy), but often, medication (pharmacological therapy) is necessary.    Standard nonpharmacological therapy consists primarily of modifying diet and lifestyle. This therapy may modestly reduce LDL cholesterol, but is not likely to lower the LDL cholesterol level more than about 30 mg/dL.    In patients without atherosclerosis who have modestly elevated LDL cholesterol levels, treatment with medication is not urgent, and an initial 6-12 month trial of nonpharmacological therapy may be advised. If the LDL cholesterol falls to an acceptable level within this time, the patient can continue with this treatment only. If the level remains high, however, pharmacological therapy should be initiated.    Lifestyle changes that may lower LDL cholesterol levels include the following:    Diet:    Minimize cholesterol and fat intake, especially saturated fat, which raises cholesterol levels more than any other substance. Cholesterol and saturated fats are found primarily in foods derived from animals, such as meats and dairy products.     Dietary guidelines for reducing cholesterol and fat consumption:  Eat lean fish, poultry, and meat. Remove the skin from chicken and trim the fat from beef before cooking.    Avoid commercially prepared and processed food (e.g., cakes, cookies) and breaded fried foods.  Increase the intake of fruits, vegetables, breads, cereals, rice, legumes (e.g., beans, peas), and pasta.    Use skim or 1% milk.    Eat no more than 2 egg yolks (or whole eggs) per week.    Use cooking oils that are high in unsaturated fat (e.g., corn, olive, canola, safflower oils)    Use soft margarine, which contains less saturated fat than butter.    The Food and Drug Administration (FDA) " recently approved two cholesterol-lowering margarine products (Benecol and Take Control). These margarines contain plant-derived substances that can decrease the absorption of cholesterol in the digestive tract and may reduce cholesterol by about 7-10 percent. They should not be used instead of drug therapy, but may be added to a nonpharmacological treatment plan for high cholesterol.    Weight loss:   Losing a modest amount of weight (even 5-10 lbs.) can double the reduction in LDL levels achieved through an improved diet. Weight loss should be gradual.    Exercise:   Exercise can decrease LDL levels and increase HDL levels. For example, taking a brisk 30-minute walk 3-4 times a week can positively impact cholesterol levels. Patients with chest pain and/or known or suspected heart disease should talk to their physician before beginning any exercise program.

## 2024-03-29 NOTE — PROGRESS NOTES
Follow Up Office Visit      Date: 2024   Patient Name: Peter Faye  : 1955   MRN: [id number]     Chief Complaint:    Chief Complaint   Patient presents with    Follow-up     3 month cholesterol and liver enzymes       History of Present Illness: Peter Faye is a 69 y.o. male who is here today to follow up with the following problems.    HPI  The patient is a 69-year-old male who is here for follow up with. He is accompanied by an adult female (his wife Liliana).    He went to the hospital in 2023 and was told that his spleen was mildly enlarged (reviewed ultrasound dated 2023, borderline splenomegaly). He had abdominal pain while at work. He has since noticed he often carries 100-pound boards that press against his body and were causing the pain.  This has resolved since he switched it to the other side    His energy is good. He is able to work every day. His memory is doing good. His reflexes are slightly slower. He exercises frequently. He has gained some weight. He has removed gluten from his diet mostly and is watching his carb intake. He skipped his vitamin D supplement for the last few weeks.    He had an appointment with a neurologist, Dr. Butts, in 2024, but he had to cancel it due to sickness. He has not rescheduled it yet.     He has hearing aids.    He had cataract surgery in 2023.        T2DM  - diet controlled, not taking medications. Last A1c 6.6% (down form 8%)    The 10-year ASCVD risk score (Neal HALEY, et al., 2019) is: 36.1%    Values used to calculate the score:      Age: 69 years      Sex: Male      Is Non- : No      Diabetic: Yes      Tobacco smoker: No      Systolic Blood Pressure: 130 mmHg      Is BP treated: No      HDL Cholesterol: 38 mg/dL      Total Cholesterol: 222 mg/dL      Subjective      Review of Systems:   Review of Systems    I have reviewed the patients family history, social history, past medical history, past surgical  history and have updated it as appropriate.     Medications:     Current Outpatient Medications:     vitamin D (ERGOCALCIFEROL) 1.25 MG (43850 UT) capsule capsule, Take 1 capsule by mouth 1 (One) Time Per Week., Disp: 5 capsule, Rfl: 3    rosuvastatin (Crestor) 10 MG tablet, Take 1 tablet by mouth Daily., Disp: 90 tablet, Rfl: 3    Allergies:   No Known Allergies    Objective     Physical Exam: Please see above  Vital Signs:   Vitals:    03/29/24 1127   BP: 130/72   BP Location: Left arm   Patient Position: Sitting   Cuff Size: Adult   Pulse: 77   Resp: 20   Temp: 97.7 °F (36.5 °C)   TempSrc: Temporal   Weight: 93.5 kg (206 lb 2 oz)   PainSc: 0-No pain     Body mass index is 27.96 kg/m².          Physical Exam  Vitals reviewed.   Constitutional:       General: He is not in acute distress.     Appearance: Normal appearance. He is not ill-appearing or toxic-appearing.   HENT:      Head: Normocephalic and atraumatic.      Right Ear: External ear normal.      Left Ear: External ear normal.      Nose: Nose normal. No congestion.      Mouth/Throat:      Mouth: Mucous membranes are moist.      Pharynx: No oropharyngeal exudate or posterior oropharyngeal erythema.   Eyes:      General: No scleral icterus.        Right eye: No discharge.         Left eye: No discharge.      Extraocular Movements: Extraocular movements intact.      Pupils: Pupils are equal, round, and reactive to light.   Cardiovascular:      Rate and Rhythm: Normal rate and regular rhythm.      Pulses: Normal pulses.      Heart sounds: Normal heart sounds.   Pulmonary:      Effort: Pulmonary effort is normal. No respiratory distress.      Breath sounds: Normal breath sounds. No stridor. No wheezing or rhonchi.   Abdominal:      General: Abdomen is flat. Bowel sounds are normal. There is no distension.      Palpations: Abdomen is soft. There is no mass.      Tenderness: There is no abdominal tenderness. There is no guarding.   Musculoskeletal:          General: No swelling or deformity. Normal range of motion.      Cervical back: Normal range of motion. No rigidity.   Skin:     General: Skin is warm and dry.      Capillary Refill: Capillary refill takes less than 2 seconds.      Coloration: Skin is not jaundiced or pale.   Neurological:      General: No focal deficit present.      Mental Status: He is alert and oriented to person, place, and time. Mental status is at baseline.   Psychiatric:         Mood and Affect: Mood normal.         Behavior: Behavior normal.         Judgment: Judgment normal.         Procedures    Results:   Labs:   Hemoglobin A1C   Date Value Ref Range Status   11/20/2023 6.6 (A) 4.5 - 5.7 % Final   09/12/2023 8.00 (H) 4.80 - 5.60 % Final     TSH   Date Value Ref Range Status   10/24/2023 2.350 0.270 - 4.200 uIU/mL Final      Laboratory Studies  Labs were reviewed with the patient from 10/2023.    Imaging  MRI was reviewed with the patient.    Imaging:   No valid procedures specified.     Assessment / Plan      Assessment/Plan:   Problem List Items Addressed This Visit       Type 2 diabetes mellitus without complication, without long-term current use of insulin - Primary    Relevant Medications    rosuvastatin (Crestor) 10 MG tablet    Other Relevant Orders    Comprehensive metabolic panel    POC Glycosylated Hemoglobin (Hb A1C) (Completed)    Vitamin D deficiency    Relevant Orders    Vitamin D 25 hydroxy    Mixed hyperlipidemia    Relevant Medications    rosuvastatin (Crestor) 10 MG tablet    Other Relevant Orders    Lipid panel    Comprehensive metabolic panel     Other Visit Diagnoses       Elevated ferritin        Relevant Orders    Ferritin            Memory issues.  His memory is doing better.  He will continue to follow up with Dr. Butts.    Elevated cholesterol.  His ASCVD risk score is 36.1 percent.   We discussed starting him on rosuvastatin 10 mg daily to help reduce the risk of heart attack and stroke. Side effects were  discussed.   I will check his cholesterol in 3 months.   A1c was taken in clinic today.   He will continue with a healthy diet and exercise.    NAFLD  His smooth muscle antibody was positive, and his ferritin was elevated however we discussed that this could be secondary to any process that causes mild inflammation in the liver and is not necessarily diagnostic of an autoimmune condition  I will defer to Dr. Guillen for further evaluation recommendations.  Will send this note to him.      Follow Up:   Return in about 3 months (around 6/29/2024) for Fasting labs prior, recheck diabetes cholesterol .    I spent 33 minutes caring for Peter on this date of service. This time includes time spent by me in the following activities: preparing for the visit, reviewing tests, obtaining and/or reviewing a separately obtained history, performing a medically appropriate examination and/or evaluation, counseling and educating the patient/family/caregiver, ordering medications, tests, or procedures, and documenting information in the medical record        Raghav Whitten MD   [facility]      Transcribed from ambient dictation for Raghav Whitten MD by Christina Echeverria.   03/29/24   13:23 EDT    Patient or patient representative verbalized consent to the visit recording.  I have personally performed the services described in this document as transcribed by the above individual, and it is both accurate and complete.

## 2024-03-29 NOTE — Clinical Note
Patient had questions about elevated ferritin and anti-smooth muscle antibody.  I discussed that these can be nonspecific and due to any liver inflammation.  There is no stat follow-up with you this spring but have been rescheduled to the fall.  Please let us know if you would like to see them back any sooner.  Thanks for your help in caring for Ryan Tyson

## 2024-04-04 ENCOUNTER — TELEPHONE (OUTPATIENT)
Dept: GASTROENTEROLOGY | Facility: CLINIC | Age: 69
End: 2024-04-04
Payer: COMMERCIAL

## 2024-04-04 NOTE — TELEPHONE ENCOUNTER
SPOKE TO PT'S ASSISTANT AND SHE STATES PT WILL GIVE US A CALL BACK TO SCHEDULE OV WITH DR. ASTUDILLO

## 2024-04-05 ENCOUNTER — TELEPHONE (OUTPATIENT)
Dept: GASTROENTEROLOGY | Facility: CLINIC | Age: 69
End: 2024-04-05
Payer: COMMERCIAL

## 2024-04-23 ENCOUNTER — OFFICE VISIT (OUTPATIENT)
Dept: GASTROENTEROLOGY | Facility: CLINIC | Age: 69
End: 2024-04-23
Payer: MEDICARE

## 2024-04-23 VITALS — WEIGHT: 206 LBS | BODY MASS INDEX: 27.9 KG/M2 | HEIGHT: 72 IN

## 2024-04-23 DIAGNOSIS — K76.0 NONALCOHOLIC FATTY LIVER DISEASE: ICD-10-CM

## 2024-04-23 DIAGNOSIS — R79.89 ELEVATED LIVER FUNCTION TESTS: Primary | ICD-10-CM

## 2024-04-23 PROCEDURE — 1160F RVW MEDS BY RX/DR IN RCRD: CPT | Performed by: INTERNAL MEDICINE

## 2024-04-23 PROCEDURE — 1159F MED LIST DOCD IN RCRD: CPT | Performed by: INTERNAL MEDICINE

## 2024-04-23 PROCEDURE — 99214 OFFICE O/P EST MOD 30 MIN: CPT | Performed by: INTERNAL MEDICINE

## 2024-04-23 NOTE — PROGRESS NOTES
PCP:  Raghav Whitten MD     No referring provider defined for this encounter.    Chief Complaint   Patient presents with    Follow-up     Follow up elevated LFT's        HPI   The patient is a 69-year-old here with elevated liver chemistries.  He has maintained his weight and may have lost a little bit of weight. His last liver chemistries were done in December 28, 2023.  This showed a slight elevation in the ALT to 44, AST and normal at 36, alk phos normal at 69, and bilirubin normal at 0.4.  His COELHO Fibrosure predicted F1 to F2 fibrosis.  It predicted moderate to severe steatosis and moderate Coelho.  Antibody levels were drawn.  The only positive was an anti-smooth muscle antibody that was 22 (normal less than 20.  His other autoimmune markers were negative.  His ferritin was elevated but his iron studies were within normal limits.    No Known Allergies       Current Outpatient Medications:     rosuvastatin (Crestor) 10 MG tablet, Take 1 tablet by mouth Daily., Disp: 90 tablet, Rfl: 3    vitamin D (ERGOCALCIFEROL) 1.25 MG (38702 UT) capsule capsule, Take 1 capsule by mouth 1 (One) Time Per Week., Disp: 5 capsule, Rfl: 3     Past Medical History:   Diagnosis Date    Cataract August 2023    Fatty liver     HL (hearing loss) 2010    Hyperlipidemia     Nephrolithiasis        Past Surgical History:   Procedure Laterality Date    EYE SURGERY  Sept 18 2023    Cataract        Social History     Socioeconomic History    Marital status:    Tobacco Use    Smoking status: Never    Smokeless tobacco: Never   Vaping Use    Vaping status: Never Used    Passive vaping exposure: Yes   Substance and Sexual Activity    Alcohol use: Never    Drug use: Never    Sexual activity: Not Currently     Partners: Female        Family History   Problem Relation Age of Onset    Cancer Mother         pancreatic    Diabetes Mother     Arthritis Father     Stroke Father 94    Heart attack Father     Liver cancer Brother     Diabetes  Brother     Cancer Brother     Colon cancer Neg Hx     Prostate cancer Neg Hx         Review of Systems     There were no vitals filed for this visit.     Physical Exam  Constitutional:       General: He is not in acute distress.     Appearance: Normal appearance. He is not ill-appearing.   Neurological:      Mental Status: He is alert.          Diagnoses and all orders for this visit:    1. Elevated liver function tests (Primary)  -     Hepatic Function Panel    2. Nonalcoholic fatty liver disease    Impressions and plan #1 elevated liver tests: His liver tests are only mildly elevated.  We again discussed modest weight reduction.  This alone may normalize his liver tests.  He is at least predicted to have a small bit of fibrosis so would be in his best interest.  We typically think about 10% of body weight which in his case would be about 20 pounds.  We talked about the slightly elevated anti-smooth muscle antibody.  I think this is less likely because in his situation.  90% of these patients are women.  If his liver tests were significantly elevated that would be some I would check for.  We will see him back in 6 months.  We may check a hematochromatosis genetic marker as well as his ferritin is over thousand but his iron studies are not as high as I would expect.      #2 health maintenance: He apparently has had a negative Cologuard.  I talked about Cologuard and I agree be best that he consider a colonoscopy if not now certainly for his next screening evaluation.  Colonoscopy only screens for colon cancer but removes polyps which have the potential in cases to get larger and turn into cancers.    Peter Guillen MD

## 2024-04-23 NOTE — LETTER
April 23, 2024       No Recipients    Patient: Peter Faye   YOB: 1955   Date of Visit: 4/23/2024     Dear Raghav Whitten MD:       Thank you for referring Peter Faye to me for evaluation. Below are the relevant portions of my assessment and plan of care.    If you have questions, please do not hesitate to call me. I look forward to following Peter along with you.         Sincerely,        Peter Guillen MD        CC:   No Recipients    Peter Guillen MD  04/23/24 1557  Sign when Signing Visit  PCP:  Raghav Whitten MD     No referring provider defined for this encounter.    Chief Complaint   Patient presents with   • Follow-up     Follow up elevated LFT's        HPI   The patient is a 69-year-old here with elevated liver chemistries.  He has maintained his weight and may have lost a little bit of weight. His last liver chemistries were done in December 28, 2023.  This showed a slight elevation in the ALT to 44, AST and normal at 36, alk phos normal at 69, and bilirubin normal at 0.4.  His DOUGLAS Fibrosure predicted F1 to F2 fibrosis.  It predicted moderate to severe steatosis and moderate Douglas.  Antibody levels were drawn.  The only positive was an anti-smooth muscle antibody that was 22 (normal less than 20.  His other autoimmune markers were negative.  His ferritin was elevated but his iron studies were within normal limits.    No Known Allergies       Current Outpatient Medications:   •  rosuvastatin (Crestor) 10 MG tablet, Take 1 tablet by mouth Daily., Disp: 90 tablet, Rfl: 3  •  vitamin D (ERGOCALCIFEROL) 1.25 MG (25427 UT) capsule capsule, Take 1 capsule by mouth 1 (One) Time Per Week., Disp: 5 capsule, Rfl: 3     Past Medical History:   Diagnosis Date   • Cataract August 2023   • Fatty liver    • HL (hearing loss) 2010   • Hyperlipidemia    • Nephrolithiasis        Past Surgical History:   Procedure Laterality Date   • EYE SURGERY  Sept 18 2023    Cataract        Social History      Socioeconomic History   • Marital status:    Tobacco Use   • Smoking status: Never   • Smokeless tobacco: Never   Vaping Use   • Vaping status: Never Used   • Passive vaping exposure: Yes   Substance and Sexual Activity   • Alcohol use: Never   • Drug use: Never   • Sexual activity: Not Currently     Partners: Female        Family History   Problem Relation Age of Onset   • Cancer Mother         pancreatic   • Diabetes Mother    • Arthritis Father    • Stroke Father 94   • Heart attack Father    • Liver cancer Brother    • Diabetes Brother    • Cancer Brother    • Colon cancer Neg Hx    • Prostate cancer Neg Hx         Review of Systems     There were no vitals filed for this visit.     Physical Exam  Constitutional:       General: He is not in acute distress.     Appearance: Normal appearance. He is not ill-appearing.   Neurological:      Mental Status: He is alert.          Diagnoses and all orders for this visit:    1. Elevated liver function tests (Primary)  -     Hepatic Function Panel    2. Nonalcoholic fatty liver disease    Impressions and plan #1 elevated liver tests: His liver tests are only mildly elevated.  We again discussed modest weight reduction.  This alone may normalize his liver tests.  He is at least predicted to have a small bit of fibrosis so would be in his best interest.  We typically think about 10% of body weight which in his case would be about 20 pounds.  We talked about the slightly elevated anti-smooth muscle antibody.  I think this is less likely because in his situation.  90% of these patients are women.  If his liver tests were significantly elevated that would be some I would check for.  We will see him back in 6 months.  We may check a hematochromatosis genetic marker as well as his ferritin is over thousand but his iron studies are not as high as I would expect.      #2 health maintenance: He apparently has had a negative Cologuard.  I talked about Cologuard and I agree  be best that he consider a colonoscopy if not now certainly for his next screening evaluation.  Colonoscopy only screens for colon cancer but removes polyps which have the potential in cases to get larger and turn into cancers.    Peter Guillen MD

## 2024-07-02 ENCOUNTER — LAB (OUTPATIENT)
Dept: LAB | Facility: HOSPITAL | Age: 69
End: 2024-07-02
Payer: MEDICARE

## 2024-07-02 ENCOUNTER — OFFICE VISIT (OUTPATIENT)
Dept: INTERNAL MEDICINE | Facility: CLINIC | Age: 69
End: 2024-07-02
Payer: MEDICARE

## 2024-07-02 VITALS
BODY MASS INDEX: 27.4 KG/M2 | TEMPERATURE: 98 F | WEIGHT: 202 LBS | HEART RATE: 79 BPM | SYSTOLIC BLOOD PRESSURE: 110 MMHG | RESPIRATION RATE: 20 BRPM | OXYGEN SATURATION: 97 % | DIASTOLIC BLOOD PRESSURE: 80 MMHG

## 2024-07-02 DIAGNOSIS — B35.1 ONYCHOMYCOSIS: ICD-10-CM

## 2024-07-02 DIAGNOSIS — E78.2 MIXED HYPERLIPIDEMIA: ICD-10-CM

## 2024-07-02 DIAGNOSIS — E11.9 TYPE 2 DIABETES MELLITUS WITHOUT COMPLICATION, WITHOUT LONG-TERM CURRENT USE OF INSULIN: ICD-10-CM

## 2024-07-02 DIAGNOSIS — E11.9 TYPE 2 DIABETES MELLITUS WITHOUT COMPLICATION, WITHOUT LONG-TERM CURRENT USE OF INSULIN: Primary | ICD-10-CM

## 2024-07-02 DIAGNOSIS — R79.89 ELEVATED LIVER FUNCTION TESTS: ICD-10-CM

## 2024-07-02 DIAGNOSIS — K76.0 NONALCOHOLIC FATTY LIVER DISEASE: ICD-10-CM

## 2024-07-02 LAB
ALBUMIN UR-MCNC: 9.2 MG/DL
CREAT UR-MCNC: 178.9 MG/DL
HBA1C MFR BLD: 7.6 % (ref 4.8–5.6)
MICROALBUMIN/CREAT UR: 51.4 MG/G (ref 0–29)

## 2024-07-02 PROCEDURE — 99214 OFFICE O/P EST MOD 30 MIN: CPT | Performed by: STUDENT IN AN ORGANIZED HEALTH CARE EDUCATION/TRAINING PROGRAM

## 2024-07-02 PROCEDURE — G2211 COMPLEX E/M VISIT ADD ON: HCPCS | Performed by: STUDENT IN AN ORGANIZED HEALTH CARE EDUCATION/TRAINING PROGRAM

## 2024-07-02 PROCEDURE — 1159F MED LIST DOCD IN RCRD: CPT | Performed by: STUDENT IN AN ORGANIZED HEALTH CARE EDUCATION/TRAINING PROGRAM

## 2024-07-02 PROCEDURE — 82570 ASSAY OF URINE CREATININE: CPT

## 2024-07-02 PROCEDURE — 82043 UR ALBUMIN QUANTITATIVE: CPT

## 2024-07-02 PROCEDURE — 3044F HG A1C LEVEL LT 7.0%: CPT | Performed by: STUDENT IN AN ORGANIZED HEALTH CARE EDUCATION/TRAINING PROGRAM

## 2024-07-02 PROCEDURE — 1126F AMNT PAIN NOTED NONE PRSNT: CPT | Performed by: STUDENT IN AN ORGANIZED HEALTH CARE EDUCATION/TRAINING PROGRAM

## 2024-07-02 PROCEDURE — 83036 HEMOGLOBIN GLYCOSYLATED A1C: CPT

## 2024-07-02 PROCEDURE — 1160F RVW MEDS BY RX/DR IN RCRD: CPT | Performed by: STUDENT IN AN ORGANIZED HEALTH CARE EDUCATION/TRAINING PROGRAM

## 2024-07-02 NOTE — PROGRESS NOTES
Follow Up Office Visit      Date: 2024   Patient Name: Peter Faye  : 1955   MRN: [id number]     Chief Complaint:    Chief Complaint   Patient presents with    Follow-up     3 month diabetes and cholesterol        History of Present Illness: Peter Faye is a 69 y.o. male who is here today to follow up with medical problems.    HPI  History of Present Illness  The patient presents for evaluation of multiple medical concerns.    The patient reports overall good health and is responding well to Crestor, with no reported side effects. He is making efforts towards weight loss, aiming to reach a weight of 190 pounds. His last ophthalmological examination was conducted a few months prior, during which he received new eyeglasses. His cognitive function is satisfactory.   His mother and grandmother had diabetes.  -He is requesting a refill on ciclopirox for continued treatment of his toenail fungus, notes significant improvement with this medication.         Subjective      Review of Systems:   Review of Systems    I have reviewed the patients family history, social history, past medical history, past surgical history and have updated it as appropriate.     Medications:     Current Outpatient Medications:     Ciclopirox 8 % kit, Apply 1 Application topically Every Night. Remove with rubbing alcohol once weekly., Disp: 1 each, Rfl: 11    rosuvastatin (Crestor) 10 MG tablet, Take 1 tablet by mouth Daily., Disp: 90 tablet, Rfl: 3    vitamin D (ERGOCALCIFEROL) 1.25 MG (81846 UT) capsule capsule, Take 1 capsule by mouth 1 (One) Time Per Week., Disp: 5 capsule, Rfl: 3    Allergies:   No Known Allergies    Objective     Physical Exam: Please see above  Vital Signs:   Vitals:    24 1115   BP: 110/80   BP Location: Left arm   Patient Position: Sitting   Cuff Size: Adult   Pulse: 79   Resp: 20   Temp: 98 °F (36.7 °C)   TempSrc: Temporal   SpO2: 97%   Weight: 91.6 kg (202 lb)   PainSc: 0-No pain     Body mass  index is 27.4 kg/m².          Physical Exam  Vitals reviewed.   Constitutional:       General: He is not in acute distress.     Appearance: Normal appearance. He is not ill-appearing or toxic-appearing.   HENT:      Head: Normocephalic and atraumatic.      Right Ear: External ear normal.      Left Ear: External ear normal.      Nose: Nose normal. No congestion.      Mouth/Throat:      Mouth: Mucous membranes are moist.   Eyes:      General: No scleral icterus.        Right eye: No discharge.         Left eye: No discharge.      Extraocular Movements: Extraocular movements intact.   Cardiovascular:      Rate and Rhythm: Normal rate and regular rhythm.      Pulses: Normal pulses.      Heart sounds: Normal heart sounds.   Pulmonary:      Effort: Pulmonary effort is normal. No respiratory distress.      Breath sounds: Normal breath sounds. No stridor. No wheezing or rhonchi.   Abdominal:      General: Abdomen is flat. Bowel sounds are normal. There is no distension.      Palpations: Abdomen is soft.      Tenderness: There is no abdominal tenderness. There is no guarding.   Musculoskeletal:      Cervical back: Normal range of motion. No rigidity.   Skin:     General: Skin is warm and dry.      Capillary Refill: Capillary refill takes less than 2 seconds.   Neurological:      General: No focal deficit present.      Mental Status: He is alert and oriented to person, place, and time. Mental status is at baseline.   Psychiatric:         Mood and Affect: Mood normal.         Behavior: Behavior normal.         Judgment: Judgment normal.       Physical Exam  Vital Signs  Patient's weight is 202.      Procedures    Results:   Labs:   Hemoglobin A1C   Date Value Ref Range Status   03/29/2024 6.8 (A) 4.5 - 5.7 % Final   09/12/2023 8.00 (H) 4.80 - 5.60 % Final     TSH   Date Value Ref Range Status   10/24/2023 2.350 0.270 - 4.200 uIU/mL Final      Results  Laboratory Studies  A1c was 6.8 three months ago.      Imaging:   No valid  procedures specified.     Assessment / Plan      Assessment/Plan:   Problem List Items Addressed This Visit       Type 2 diabetes mellitus without complication, without long-term current use of insulin - Primary    Relevant Orders    Hemoglobin A1c    Microalbumin / Creatinine Urine Ratio - Urine, Clean Catch    Onychomycosis    Relevant Medications    Ciclopirox 8 % kit    Mixed hyperlipidemia     Other Visit Diagnoses       Elevated liver function tests                Assessment & Plan  1. Hyperlipidemia.  The patient's hyperlipidemia is chronic and unchanged. A fasting lipid panel will be conducted today to assess improvement with Crestor 10 mg once daily. It is recommended that the patient continues with Crestor 10 mg once daily.    2. Type 2 diabetes mellitus.  This condition is chronic and unchanged. It is currently well-managed with diet and exercise. The patient has lost approximately 9 pounds since 12/2022, which I suspect will improve his A1c. He reports that he is up-to-date with his diabetic eye exam. A repeat A1c test will be conducted today. The patient has been counseled once again on a low carbohydrate diet and regular exercise, as well as continued weight loss. Records for eye exam will be obtained today. A repeat urine microalbumin creatinine ratio will be repeated to assess for persistent microalbuminuria. Should the urine microalbumin creatinine ratio remain elevated, consideration will be given to starting a low dose ACE inhibitor.    3. Elevated liver function test.  A review of the patient's note by Dr. Peter Guillen dated 04/23/2024 was conducted. It was recommended that weight loss and hemochromatosis genetic marker panel be conducted.    Follow-up  A follow-up visit is scheduled for 6 months from now.       Follow Up:   Return in about 5 months (around 12/2/2024) for Medicare Wellness.      Raghav Whitten MD   [facility]    Patient or patient representative verbalized consent  for the use of Ambient Listening during the visit with  Raghav Whitten MD for chart documentation. 7/2/2024  17:48 EDT

## 2024-07-03 ENCOUNTER — TELEPHONE (OUTPATIENT)
Dept: INTERNAL MEDICINE | Facility: CLINIC | Age: 69
End: 2024-07-03
Payer: COMMERCIAL

## 2024-07-03 NOTE — TELEPHONE ENCOUNTER
----- Message from Raghav Whitten sent at 7/3/2024  8:03 AM EDT -----  Please call the patient regarding his abnormal result. A1c has unfortunately increased to 7.6%, a level consistent with diabetes and at a point we should consider starting medications. He has a small amount of protein in the urine, which is likely attributed to this as well.  We could consider a medication such as jardiance, which can assist with control of both of these issues. Please let me know your thoughts, or if you would like to make an appointment to discuss this.    Dr. Whitten

## 2024-07-03 NOTE — TELEPHONE ENCOUNTER
Spoke to patients wife and notified of message. She stated she will relay message to patient when he get home from work, and stated she will have him call us back on his decision for the medication, Good verb given.

## 2024-07-08 LAB
HFE GENE MUT ANL BLD/T: NORMAL
IMP & REVIEW OF LAB RESULTS: NORMAL

## 2024-10-24 ENCOUNTER — OFFICE VISIT (OUTPATIENT)
Dept: GASTROENTEROLOGY | Facility: CLINIC | Age: 69
End: 2024-10-24
Payer: MEDICARE

## 2024-10-24 VITALS — HEIGHT: 72 IN | WEIGHT: 204 LBS | BODY MASS INDEX: 27.63 KG/M2

## 2024-10-24 DIAGNOSIS — R79.89 ELEVATED LIVER FUNCTION TESTS: Primary | ICD-10-CM

## 2024-10-24 DIAGNOSIS — K76.0 NONALCOHOLIC FATTY LIVER DISEASE: ICD-10-CM

## 2024-10-24 PROCEDURE — 1160F RVW MEDS BY RX/DR IN RCRD: CPT | Performed by: INTERNAL MEDICINE

## 2024-10-24 PROCEDURE — 1159F MED LIST DOCD IN RCRD: CPT | Performed by: INTERNAL MEDICINE

## 2024-10-24 PROCEDURE — 99213 OFFICE O/P EST LOW 20 MIN: CPT | Performed by: INTERNAL MEDICINE

## 2024-10-24 NOTE — PROGRESS NOTES
PCP:  Raghav Whitten MD     No referring provider defined for this encounter.    Chief Complaint   Patient presents with    Follow-up     Follow up elevated lft's         HPI   Patient is a 69-year-old here with elevated liver chemistries.  His weight is about the same as it was.  He was 202 pounds now he is 204 pounds.  His AST and ALT were minimally elevated they did in the past.  His ALT was 44 and AST 36.  His alkaline phosphatase was normal at 69 and bilirubin was normal at 0.4.  His COELHO Fibrosure showed F1-F2 fibrosis.  There was moderate to severe steatosis and moderate Coelho.  His anti-smooth muscle antibody was 22 with normal being less than 20.  He has not had any repeat liver tests.  His colon screening was a Cologuard recently.    No Known Allergies       Current Outpatient Medications:     Ciclopirox 8 % kit, Apply 1 Application topically Every Night. Remove with rubbing alcohol once weekly., Disp: 1 each, Rfl: 11    rosuvastatin (Crestor) 10 MG tablet, Take 1 tablet by mouth Daily., Disp: 90 tablet, Rfl: 3    vitamin D (ERGOCALCIFEROL) 1.25 MG (80198 UT) capsule capsule, Take 1 capsule by mouth 1 (One) Time Per Week., Disp: 5 capsule, Rfl: 3     Past Medical History:   Diagnosis Date    Cataract August 2023    Fatty liver     HL (hearing loss) 2010    Hyperlipidemia     Nephrolithiasis        Past Surgical History:   Procedure Laterality Date    EYE SURGERY  Sept 18 2023    Cataract        Social History     Socioeconomic History    Marital status:    Tobacco Use    Smoking status: Never    Smokeless tobacco: Never   Vaping Use    Vaping status: Never Used    Passive vaping exposure: Yes   Substance and Sexual Activity    Alcohol use: Never    Drug use: Never    Sexual activity: Not Currently     Partners: Female        Family History   Problem Relation Age of Onset    Cancer Mother         pancreatic    Diabetes Mother     Arthritis Father     Stroke Father 94    Heart attack Father     Liver  cancer Brother     Diabetes Brother     Cancer Brother     Colon cancer Neg Hx     Prostate cancer Neg Hx         Review of Systems     There were no vitals filed for this visit.     Physical Exam  Constitutional:       General: He is not in acute distress.     Appearance: Normal appearance. He is not ill-appearing.   Neurological:      Mental Status: He is alert.          Diagnoses and all orders for this visit:    1. Elevated liver function tests (Primary)  -     Hepatic Function Panel; Future    2. Nonalcoholic fatty liver disease    Impressions and plan #1 elevated liver tests: His elevations are relatively modest.  We can repeat those.  I have encouraged him to consider modest weight reduction in the range of 10% of body weight if possible.  5% would be meaningful as well.  He wants to do those blood work tests down in [place].  I gave him an order and a follow-up evaluation.    Peter Guillen MD

## 2024-10-24 NOTE — LETTER
October 24, 2024     Raghav Whitten MD  [address]  [place] [address]    Patient: Peter Faye   YOB: 1955   Date of Visit: 10/24/2024     Dear Raghav Whitten MD:       Thank you for referring Peter Faye to me for evaluation. Below are the relevant portions of my assessment and plan of care.    If you have questions, please do not hesitate to call me. I look forward to following Peter along with you.         Sincerely,        Peter Guillen MD        CC: No Recipients    Peter Guillen MD  10/24/24 1532  Sign when Signing Visit  PCP:  Raghav Whitten MD     No referring provider defined for this encounter.    Chief Complaint   Patient presents with   • Follow-up     Follow up elevated lft's         HPI   Patient is a 69-year-old here with elevated liver chemistries.  His weight is about the same as it was.  He was 202 pounds now he is 204 pounds.  His AST and ALT were minimally elevated they did in the past.  His ALT was 44 and AST 36.  His alkaline phosphatase was normal at 69 and bilirubin was normal at 0.4.  His DOUGLAS Fibrosure showed F1-F2 fibrosis.  There was moderate to severe steatosis and moderate Douglas.  His anti-smooth muscle antibody was 22 with normal being less than 20.  He has not had any repeat liver tests.  His colon screening was a Cologuard recently.    No Known Allergies       Current Outpatient Medications:   •  Ciclopirox 8 % kit, Apply 1 Application topically Every Night. Remove with rubbing alcohol once weekly., Disp: 1 each, Rfl: 11  •  rosuvastatin (Crestor) 10 MG tablet, Take 1 tablet by mouth Daily., Disp: 90 tablet, Rfl: 3  •  vitamin D (ERGOCALCIFEROL) 1.25 MG (12505 UT) capsule capsule, Take 1 capsule by mouth 1 (One) Time Per Week., Disp: 5 capsule, Rfl: 3     Past Medical History:   Diagnosis Date   • Cataract August 2023   • Fatty liver    • HL (hearing loss) 2010   • Hyperlipidemia    • Nephrolithiasis        Past Surgical History:    Procedure Laterality Date   • EYE SURGERY  Sept 18 2023    Cataract        Social History     Socioeconomic History   • Marital status:    Tobacco Use   • Smoking status: Never   • Smokeless tobacco: Never   Vaping Use   • Vaping status: Never Used   • Passive vaping exposure: Yes   Substance and Sexual Activity   • Alcohol use: Never   • Drug use: Never   • Sexual activity: Not Currently     Partners: Female        Family History   Problem Relation Age of Onset   • Cancer Mother         pancreatic   • Diabetes Mother    • Arthritis Father    • Stroke Father 94   • Heart attack Father    • Liver cancer Brother    • Diabetes Brother    • Cancer Brother    • Colon cancer Neg Hx    • Prostate cancer Neg Hx         Review of Systems     There were no vitals filed for this visit.     Physical Exam  Constitutional:       General: He is not in acute distress.     Appearance: Normal appearance. He is not ill-appearing.   Neurological:      Mental Status: He is alert.          Diagnoses and all orders for this visit:    1. Elevated liver function tests (Primary)  -     Hepatic Function Panel; Future    2. Nonalcoholic fatty liver disease    Impressions and plan #1 elevated liver tests: His elevations are relatively modest.  We can repeat those.  I have encouraged him to consider modest weight reduction in the range of 10% of body weight if possible.  5% would be meaningful as well.  He wants to do those blood work tests down in [place].  I gave him an order and a follow-up evaluation.    Peter Guillen MD